# Patient Record
Sex: MALE | Employment: FULL TIME | URBAN - METROPOLITAN AREA
[De-identification: names, ages, dates, MRNs, and addresses within clinical notes are randomized per-mention and may not be internally consistent; named-entity substitution may affect disease eponyms.]

---

## 2017-02-16 ENCOUNTER — OFFICE VISIT (OUTPATIENT)
Dept: FAMILY MEDICINE CLINIC | Age: 52
End: 2017-02-16

## 2017-02-16 VITALS
SYSTOLIC BLOOD PRESSURE: 165 MMHG | RESPIRATION RATE: 14 BRPM | HEIGHT: 75 IN | WEIGHT: 188 LBS | OXYGEN SATURATION: 98 % | DIASTOLIC BLOOD PRESSURE: 100 MMHG | BODY MASS INDEX: 23.38 KG/M2 | HEART RATE: 90 BPM

## 2017-02-16 DIAGNOSIS — I10 ESSENTIAL HYPERTENSION: Primary | ICD-10-CM

## 2017-02-16 DIAGNOSIS — F10.10 ALCOHOL ABUSE: ICD-10-CM

## 2017-02-16 DIAGNOSIS — G60.9 IDIOPATHIC PERIPHERAL NEUROPATHY: ICD-10-CM

## 2017-02-16 DIAGNOSIS — M10.472 ACUTE GOUT DUE TO OTHER SECONDARY CAUSE INVOLVING TOE OF LEFT FOOT: ICD-10-CM

## 2017-02-16 PROCEDURE — 99214 OFFICE O/P EST MOD 30 MIN: CPT | Performed by: FAMILY MEDICINE

## 2017-02-16 RX ORDER — FOLIC ACID 1 MG/1
1 TABLET ORAL DAILY
Qty: 30 TABLET | Refills: 3 | Status: SHIPPED | OUTPATIENT
Start: 2017-02-16 | End: 2017-06-01 | Stop reason: SDUPTHER

## 2017-02-16 RX ORDER — INDOMETHACIN 50 MG/1
50 CAPSULE ORAL
Qty: 30 CAPSULE | Refills: 3 | Status: SHIPPED | OUTPATIENT
Start: 2017-02-16 | End: 2017-06-01 | Stop reason: SDUPTHER

## 2017-02-16 RX ORDER — METOPROLOL SUCCINATE 50 MG/1
50 TABLET, EXTENDED RELEASE ORAL DAILY
Qty: 30 TABLET | Refills: 3 | Status: SHIPPED | OUTPATIENT
Start: 2017-02-16 | End: 2017-06-01 | Stop reason: SDUPTHER

## 2017-03-16 RX ORDER — LOSARTAN POTASSIUM 50 MG/1
50 TABLET ORAL DAILY
Qty: 30 TABLET | Refills: 0 | Status: SHIPPED | OUTPATIENT
Start: 2017-03-16 | End: 2017-04-28 | Stop reason: SDUPTHER

## 2017-04-29 RX ORDER — LOSARTAN POTASSIUM 50 MG/1
50 TABLET ORAL DAILY
Qty: 30 TABLET | Refills: 5 | Status: SHIPPED | OUTPATIENT
Start: 2017-04-29 | End: 2017-11-30 | Stop reason: SDUPTHER

## 2017-05-14 DIAGNOSIS — G60.9 IDIOPATHIC PERIPHERAL NEUROPATHY: ICD-10-CM

## 2017-05-15 RX ORDER — GABAPENTIN 600 MG/1
TABLET ORAL
Qty: 90 TABLET | Refills: 1 | Status: SHIPPED | OUTPATIENT
Start: 2017-05-15 | End: 2018-02-08 | Stop reason: SDUPTHER

## 2017-06-01 DIAGNOSIS — I10 ESSENTIAL HYPERTENSION: ICD-10-CM

## 2017-06-01 DIAGNOSIS — M10.472 ACUTE GOUT DUE TO OTHER SECONDARY CAUSE INVOLVING TOE OF LEFT FOOT: ICD-10-CM

## 2017-06-01 DIAGNOSIS — F10.10 ALCOHOL ABUSE: ICD-10-CM

## 2017-06-01 RX ORDER — METOPROLOL SUCCINATE 50 MG/1
50 TABLET, EXTENDED RELEASE ORAL DAILY
Qty: 30 TABLET | Refills: 3 | Status: SHIPPED | OUTPATIENT
Start: 2017-06-01 | End: 2017-09-30 | Stop reason: SDUPTHER

## 2017-06-01 RX ORDER — FOLIC ACID 1 MG/1
1 TABLET ORAL DAILY
Qty: 30 TABLET | Refills: 3 | Status: SHIPPED | OUTPATIENT
Start: 2017-06-01 | End: 2017-09-30 | Stop reason: SDUPTHER

## 2017-06-01 RX ORDER — INDOMETHACIN 50 MG/1
50 CAPSULE ORAL
Qty: 30 CAPSULE | Refills: 3 | Status: SHIPPED | OUTPATIENT
Start: 2017-06-01 | End: 2017-08-09 | Stop reason: ALTCHOICE

## 2017-08-09 ENCOUNTER — PROCEDURE VISIT (OUTPATIENT)
Dept: FAMILY MEDICINE CLINIC | Age: 52
End: 2017-08-09

## 2017-08-09 VITALS
OXYGEN SATURATION: 93 % | RESPIRATION RATE: 14 BRPM | DIASTOLIC BLOOD PRESSURE: 84 MMHG | WEIGHT: 180.6 LBS | BODY MASS INDEX: 22.46 KG/M2 | SYSTOLIC BLOOD PRESSURE: 112 MMHG | HEART RATE: 105 BPM | HEIGHT: 75 IN

## 2017-08-09 DIAGNOSIS — F07.81 POST-CONCUSSION SYNDROME: ICD-10-CM

## 2017-08-09 DIAGNOSIS — Z72.0 TOBACCO ABUSE: ICD-10-CM

## 2017-08-09 DIAGNOSIS — S01.01XD SCALP LACERATION, SUBSEQUENT ENCOUNTER: ICD-10-CM

## 2017-08-09 DIAGNOSIS — M54.9 UPPER BACK PAIN ON LEFT SIDE: ICD-10-CM

## 2017-08-09 DIAGNOSIS — S09.90XD HEAD INJURY, SUBSEQUENT ENCOUNTER: Primary | ICD-10-CM

## 2017-08-09 DIAGNOSIS — F10.10 ALCOHOL ABUSE: ICD-10-CM

## 2017-08-09 PROCEDURE — 99215 OFFICE O/P EST HI 40 MIN: CPT | Performed by: NURSE PRACTITIONER

## 2017-08-09 RX ORDER — IBUPROFEN 600 MG/1
600 TABLET ORAL EVERY 8 HOURS PRN
Qty: 90 TABLET | Refills: 0 | Status: SHIPPED | OUTPATIENT
Start: 2017-08-09 | End: 2017-09-30 | Stop reason: SDUPTHER

## 2017-08-09 ASSESSMENT — ENCOUNTER SYMPTOMS
BACK PAIN: 1
WHEEZING: 0
VOMITING: 1
NAUSEA: 0
SHORTNESS OF BREATH: 0
EYE PAIN: 0
COUGH: 1
BLURRED VISION: 1

## 2017-08-09 ASSESSMENT — PATIENT HEALTH QUESTIONNAIRE - PHQ9
2. FEELING DOWN, DEPRESSED OR HOPELESS: 0
SUM OF ALL RESPONSES TO PHQ QUESTIONS 1-9: 0
SUM OF ALL RESPONSES TO PHQ9 QUESTIONS 1 & 2: 0
1. LITTLE INTEREST OR PLEASURE IN DOING THINGS: 0

## 2017-09-30 DIAGNOSIS — F10.10 ALCOHOL ABUSE: ICD-10-CM

## 2017-09-30 DIAGNOSIS — M54.9 UPPER BACK PAIN ON LEFT SIDE: ICD-10-CM

## 2017-09-30 DIAGNOSIS — I10 ESSENTIAL HYPERTENSION: ICD-10-CM

## 2017-10-02 RX ORDER — IBUPROFEN 600 MG/1
TABLET ORAL
Qty: 90 TABLET | Refills: 0 | Status: SHIPPED | OUTPATIENT
Start: 2017-10-02 | End: 2017-11-30 | Stop reason: SDUPTHER

## 2017-10-03 RX ORDER — INDOMETHACIN 50 MG/1
CAPSULE ORAL
Qty: 30 CAPSULE | Refills: 3 | Status: SHIPPED | OUTPATIENT
Start: 2017-10-03 | End: 2019-01-24

## 2017-10-03 RX ORDER — METOPROLOL SUCCINATE 50 MG/1
TABLET, EXTENDED RELEASE ORAL
Qty: 30 TABLET | Refills: 3 | Status: SHIPPED | OUTPATIENT
Start: 2017-10-03 | End: 2019-01-24

## 2017-10-03 RX ORDER — MAGNESIUM OXIDE 400 MG/1
TABLET ORAL
Qty: 30 TABLET | Refills: 2 | Status: SHIPPED | OUTPATIENT
Start: 2017-10-03 | End: 2017-11-30 | Stop reason: SDUPTHER

## 2017-10-03 RX ORDER — FOLIC ACID 1 MG/1
TABLET ORAL
Qty: 30 TABLET | Refills: 3 | Status: SHIPPED | OUTPATIENT
Start: 2017-10-03 | End: 2019-01-24

## 2017-10-06 ENCOUNTER — OFFICE VISIT (OUTPATIENT)
Dept: FAMILY MEDICINE CLINIC | Age: 52
End: 2017-10-06

## 2017-10-06 VITALS
OXYGEN SATURATION: 98 % | SYSTOLIC BLOOD PRESSURE: 144 MMHG | HEART RATE: 74 BPM | WEIGHT: 198.2 LBS | DIASTOLIC BLOOD PRESSURE: 92 MMHG | BODY MASS INDEX: 24.77 KG/M2

## 2017-10-06 DIAGNOSIS — Z09 HOSPITAL DISCHARGE FOLLOW-UP: Primary | ICD-10-CM

## 2017-10-06 DIAGNOSIS — F10.10 ALCOHOL ABUSE: ICD-10-CM

## 2017-10-06 DIAGNOSIS — I10 ESSENTIAL HYPERTENSION: ICD-10-CM

## 2017-10-06 DIAGNOSIS — S02.2XXA CLOSED FRACTURE OF NASAL BONE, INITIAL ENCOUNTER: ICD-10-CM

## 2017-10-06 PROCEDURE — 99214 OFFICE O/P EST MOD 30 MIN: CPT | Performed by: FAMILY MEDICINE

## 2017-10-06 NOTE — MR AVS SNAPSHOT
After Visit Summary             Codi Mix   10/6/2017 1:45 PM   Office Visit    Description:  Male : 1965   Provider:  Deb Dillard MD   Department:  [address] [place] [address] Medicine              Your Follow-Up and Future Appointments         Below is a list of your follow-up and future appointments. This may not be a complete list as you may have made appointments directly with providers that we are not aware of or your providers may have made some for you. Please call your providers to confirm appointments. It is important to keep your appointments. Please bring your current insurance card, photo ID, co-pay, and all medication bottles to your appointment. If self-pay, payment is expected at the time of service.            Information from Your Visit        Department     Name Address Phone Fax    [address] [place] [address] [address] [phone]      You Were Seen for:         Joseph 38 discharge follow-up   [[id number]]         Vital Signs     Blood Pressure Pulse Weight Oxygen Saturation Body Mass Index Smoking Status    144/92 (Site: Left Arm, Position: Sitting, Cuff Size: Large Adult) 74 198 lb 3.2 oz (89.9 kg) 98% 24.77 kg/m2 Current Every Day Smoker         Medications and Orders      Your Current Medications Are              metoprolol succinate (TOPROL XL) 50 MG extended release tablet TAKE 1 TABLET BY MOUTH DAILY    folic acid (FOLVITE) 1 MG tablet TAKE 1 TABLET BY MOUTH DAILY    indomethacin (INDOCIN) 50 MG capsule TAKE 1 CAPSULE BY MOUTH THREE TIMES DAILY WITH MEALS    magnesium oxide (MAG-OX) 400 MG tablet TAKE 1 TABLET BY MOUTH DAILY    ibuprofen (ADVIL;MOTRIN) 600 MG tablet TAKE 1 TABLET BY MOUTH EVERY 8 HOURS AS NEEDED FOR PAIN    LORazepam (ATIVAN) 1 MG tablet TID for 1 day, then BID for 1 day, then QD for 1 day    gabapentin (NEURONTIN) 600 MG tablet TAKE 1 TABLET BY MOUTH 3 TIMES DAILY losartan (COZAAR) 50 MG tablet Take 1 tablet by mouth daily    vitamin B-1 (THIAMINE) 100 MG tablet       Allergies           No Known Allergies      We Ordered/Performed the following           Amb External Referral To Oral Maxillofacial Surgery     Comments: The patient can be scheduled with any member of the group, including the provider with the first available appointments. Additional Information        Basic Information     Date Of Birth Sex Race Ethnicity Preferred Language    1965 Male Unavailable Non-/Non  English      Problem List as of 10/6/2017  Date Reviewed: 8/9/2017                Dupuytren's disease    Tobacco abuse    Hypertension    Hepatic disease    Hyperlipidemia    Neuropathy, peripheral ([facility] [address]    Alcohol abuse      Your Goals as of 10/6/2017 at 2:50 PM              2/16/17 12/13/16       Lifestyle    Join a gym and quit smoking. 01/19/16   Not on track  Not on track      Immunizations as of 10/6/2017     Name Date    Tdap (Boostrix, Adacel) 8/26/2016, 7/9/2014      Preventive Care        Date Due    Hepatitis C screening is recommended for all adults regardless of risk factors born between [facility] at least once (lifetime) who have never been tested. 1965    HIV screening is recommended for all people regardless of risk factors  aged 15-65 years at least once (lifetime) who have never been HIV tested. 10/25/1980    Pneumococcal Vaccine - Pneumovax for adults aged 19-64 years with: chronic heart disease, chronic lung disease, diabetes mellitus, alcoholism, chronic liver disease, or cigarette smoking. (1 of 1 - PPSV23) 10/25/1984    Colonoscopy 10/25/2015    Yearly Flu Vaccine (1) 12/13/2017 (Originally 9/1/2017)    Cholesterol Screening 12/13/2021    Tetanus Combination Vaccine (3 - Td) 8/26/2026            MyChart Signup           Our records indicate that you have declined MyChart signup.

## 2017-10-06 NOTE — PROGRESS NOTES
Chief Complaint: Follow-Up from Hospital (Was seen in hospital on Monday, due to fall. Was seen in [facility]. Patient fell and broke nose. Patient stated the first time that he fell he has been feeling dizzy, back pain and feels like this fall could have been related to dizziness. )       HPI:  Reynaldo Muller is a 46 y.o. male here for hospital follow up. He was admitted for alcohol withdrawal seizures and fall. As per patient he does not remember the event but happened to fall over the face and regained his consciousness when he was in the ambulance. He is a heavy drinker and before the episode he did not drink for 2 days or so. He is not ready to quit alcohol or smoking at this point. He had the CT head and ct neck  CT neck did show T 1 closed fracture and he has been referred to neurosurgeons and he has to make an appointment . He still complaints of pain in face mostly in the nose and over his left cheek bone. He has multiple bruises and abrasions  He had a fall in July at his work place. He was concerned if this recent fall was secondary to the concussion that happened during his first fall that was in July. Since the discharge except for the soreness he is doing better. He is compliant with his hypertension medications    ROS:  Constitutional: Negative f. HENT: as mentioned above   Eyes: Negative for photophobia, discharge, redness and visual disturbance. Respiratory: Negative for cough, chest tightness, shortness of breath and wheezing. Cardiovascular: Negative for chest pain, palpitations and leg swelling. Gastrointestinal: Negative for abdominal pain, blood in stool, constipation, diarrhea, nausea and vomiting. Genitourinary: Negative for difficulty urinating, flank pain, frequency, hematuria and urgency. Musculoskeletal: complaints of pain in his face more than back   Skin: Negative for color change, pallor, rash and wound.    Neurological: Negative for dizziness, tremors, seizures, Physical exam:  Constitutional: he is oriented to person, place, and time. he appears well-developed and well-nourished. No distress. HENT:   Head: Normocephalic. Right Ear: External ear normal. Normal TM   Left Ear: External ear normal. Normal TM  Nose: Tenderness over the nasal bridge and its swollen and also tenderness over his left orbit  Mouth/Throat: Oropharynx is clear and moist. No oropharyngeal exudate. Eyes: Conjunctivae and EOM are normal. Pupils are equal, round, and reactive to light. Right eye exhibits no discharge. Left eye exhibits no discharge. No scleral icterus. Neck: Normal range of motion. No JVD present. No tracheal deviation present. No thyromegaly present. Cardiovascular: Normal rate, regular rhythm, normal heart sounds and intact distal pulses. No murmur heard. Pulmonary/Chest: Effort normal and breath sounds normal. No stridor. No respiratory distress. he has no wheezes. he has no rales. heexhibits no tenderness. Abdominal: Soft. Bowel sounds are normal. he exhibits no distension and no mass. There is no tenderness. There is no rebound and no guarding. Musculoskeletal: Tenderness over his back muscles but no tenderness over the spine  Lymphadenopathy:     he has no cervical adenopathy. Neurological:he is alert and oriented to person, place, and time. he has gross neurological exam normal with normal strength and normal gait  Skin: Multiple brusises      Assessment/Plan:   1. Hospital discharge follow-up  Admitted for alcohol withdrawal seizures and currently not ready to quit  Explained about abnromal lfts and he quotes he would try to quit but not ready yet  2. Alcohol abuse      3. Orbital fracture, sequela ([address]  Could be chronic but complaints of pain and difficulty in breathing and hence referral to maxillofacial surgery    4.  Closed fracture of nasal bone, initial encounter  - Amb External Referral To Oral Maxillofacial Surgery     Hypertension  Elevated today and could be due to pain and hence no changes made    No Follow-up on file.       Lillie Hubbard  10/6/2017 9:21 PM

## 2017-11-30 DIAGNOSIS — M54.9 UPPER BACK PAIN ON LEFT SIDE: ICD-10-CM

## 2017-11-30 DIAGNOSIS — F10.10 ALCOHOL ABUSE: ICD-10-CM

## 2017-11-30 RX ORDER — MAGNESIUM OXIDE 400 MG/1
TABLET ORAL
Qty: 30 TABLET | Refills: 10 | Status: SHIPPED | OUTPATIENT
Start: 2017-11-30 | End: 2018-10-30 | Stop reason: SDUPTHER

## 2017-11-30 RX ORDER — IBUPROFEN 600 MG/1
TABLET ORAL
Qty: 90 TABLET | Refills: 2 | Status: SHIPPED | OUTPATIENT
Start: 2017-11-30 | End: 2019-01-24

## 2017-11-30 RX ORDER — LOSARTAN POTASSIUM 50 MG/1
50 TABLET ORAL DAILY
Qty: 30 TABLET | Refills: 10 | Status: SHIPPED | OUTPATIENT
Start: 2017-11-30 | End: 2018-09-04 | Stop reason: SDUPTHER

## 2017-11-30 NOTE — TELEPHONE ENCOUNTER
Last OV  10/06/2017 hospital follow up   losartan  Last Refill 04/29/2017 #30 5 refills       Magnesium oxide  Last refill 10/03/2017 # 30 2 refills

## 2017-11-30 NOTE — TELEPHONE ENCOUNTER
Last OV  10/06/2017 hospital discharge follow up   Last Refill 10/02/2017 # 90 0 refills   Lab Results   Component Value Date     10/02/2017    K 3.6 10/02/2017     10/02/2017    CO2 29 10/02/2017    BUN 15 10/02/2017    CREATININE 0.8 (L) 10/02/2017    GLUCOSE 170 (H) 10/02/2017    CALCIUM 8.1 (L) 10/02/2017    PROT 6.1 (L) 10/02/2017    LABALBU 3.5 10/02/2017    BILITOT 0.5 10/02/2017    ALKPHOS 131 (H) 10/02/2017    AST 33 10/02/2017    ALT 13 10/02/2017    LABGLOM >60 10/02/2017    GFRAA >60 10/02/2017    AGRATIO 1.3 10/02/2017    GLOB 2.6 10/02/2017

## 2018-02-08 DIAGNOSIS — G60.9 IDIOPATHIC PERIPHERAL NEUROPATHY: ICD-10-CM

## 2018-02-08 RX ORDER — GABAPENTIN 600 MG/1
TABLET ORAL
Qty: 90 TABLET | Refills: 2 | Status: SHIPPED | OUTPATIENT
Start: 2018-02-08 | End: 2019-01-24 | Stop reason: SDUPTHER

## 2018-02-08 NOTE — TELEPHONE ENCOUNTER
Medication and Quantity requested:  gabapentin (NEURONTIN) 600 MG tablet - qty 90        Last Visit  10/06/17 - Dr Chance Alvarenga and phone number updated in EPIC:  Yes

## 2018-09-06 RX ORDER — LOSARTAN POTASSIUM 50 MG/1
50 TABLET ORAL DAILY
Qty: 30 TABLET | Refills: 2 | Status: SHIPPED | OUTPATIENT
Start: 2018-09-06 | End: 2018-12-26 | Stop reason: SDUPTHER

## 2018-10-30 DIAGNOSIS — F10.10 ALCOHOL ABUSE: ICD-10-CM

## 2018-10-30 RX ORDER — MAGNESIUM OXIDE 400 MG/1
TABLET ORAL
Qty: 30 TABLET | Refills: 0 | Status: SHIPPED | OUTPATIENT
Start: 2018-10-30 | End: 2018-11-27 | Stop reason: SDUPTHER

## 2018-11-27 DIAGNOSIS — F10.10 ALCOHOL ABUSE: ICD-10-CM

## 2018-11-27 RX ORDER — MAGNESIUM OXIDE 400 MG/1
TABLET ORAL
Qty: 30 TABLET | Refills: 2 | Status: SHIPPED | OUTPATIENT
Start: 2018-11-27 | End: 2019-01-24

## 2018-12-26 RX ORDER — LOSARTAN POTASSIUM 50 MG/1
50 TABLET ORAL DAILY
Qty: 30 TABLET | Refills: 0 | Status: SHIPPED | OUTPATIENT
Start: 2018-12-26 | End: 2019-01-22 | Stop reason: SDUPTHER

## 2018-12-26 NOTE — TELEPHONE ENCOUNTER
[phone] ([place])    Left message to scheduled appointment   LOV: 10/6/17  LRF: 9/6/18, 30, 0 refills  Teed up

## 2019-01-24 ENCOUNTER — OFFICE VISIT (OUTPATIENT)
Dept: FAMILY MEDICINE CLINIC | Age: 54
End: 2019-01-24
Payer: COMMERCIAL

## 2019-01-24 VITALS
HEIGHT: 75 IN | SYSTOLIC BLOOD PRESSURE: 124 MMHG | OXYGEN SATURATION: 98 % | BODY MASS INDEX: 25.84 KG/M2 | DIASTOLIC BLOOD PRESSURE: 86 MMHG | WEIGHT: 207.8 LBS | HEART RATE: 88 BPM

## 2019-01-24 DIAGNOSIS — I10 ESSENTIAL HYPERTENSION: ICD-10-CM

## 2019-01-24 DIAGNOSIS — G60.9 IDIOPATHIC PERIPHERAL NEUROPATHY: ICD-10-CM

## 2019-01-24 DIAGNOSIS — M72.0 DUPUYTREN CONTRACTURE: Primary | ICD-10-CM

## 2019-01-24 DIAGNOSIS — F10.10 ALCOHOL ABUSE: ICD-10-CM

## 2019-01-24 PROCEDURE — 3017F COLORECTAL CA SCREEN DOC REV: CPT | Performed by: NURSE PRACTITIONER

## 2019-01-24 PROCEDURE — G8427 DOCREV CUR MEDS BY ELIG CLIN: HCPCS | Performed by: NURSE PRACTITIONER

## 2019-01-24 PROCEDURE — 4004F PT TOBACCO SCREEN RCVD TLK: CPT | Performed by: NURSE PRACTITIONER

## 2019-01-24 PROCEDURE — G8484 FLU IMMUNIZE NO ADMIN: HCPCS | Performed by: NURSE PRACTITIONER

## 2019-01-24 PROCEDURE — 99214 OFFICE O/P EST MOD 30 MIN: CPT | Performed by: NURSE PRACTITIONER

## 2019-01-24 PROCEDURE — G8419 CALC BMI OUT NRM PARAM NOF/U: HCPCS | Performed by: NURSE PRACTITIONER

## 2019-01-24 RX ORDER — GABAPENTIN 600 MG/1
TABLET ORAL
Qty: 90 TABLET | Refills: 1 | Status: SHIPPED | OUTPATIENT
Start: 2019-01-24 | End: 2019-02-12

## 2019-01-24 ASSESSMENT — PATIENT HEALTH QUESTIONNAIRE - PHQ9
SUM OF ALL RESPONSES TO PHQ9 QUESTIONS 1 & 2: 0
2. FEELING DOWN, DEPRESSED OR HOPELESS: 0
1. LITTLE INTEREST OR PLEASURE IN DOING THINGS: 0
SUM OF ALL RESPONSES TO PHQ QUESTIONS 1-9: 0
SUM OF ALL RESPONSES TO PHQ QUESTIONS 1-9: 0

## 2019-01-24 ASSESSMENT — ENCOUNTER SYMPTOMS: SHORTNESS OF BREATH: 0

## 2019-02-05 ENCOUNTER — TELEPHONE (OUTPATIENT)
Dept: FAMILY MEDICINE CLINIC | Age: 54
End: 2019-02-05

## 2019-02-12 ENCOUNTER — OFFICE VISIT (OUTPATIENT)
Dept: FAMILY MEDICINE CLINIC | Age: 54
End: 2019-02-12
Payer: COMMERCIAL

## 2019-02-12 VITALS
DIASTOLIC BLOOD PRESSURE: 82 MMHG | HEART RATE: 70 BPM | SYSTOLIC BLOOD PRESSURE: 130 MMHG | OXYGEN SATURATION: 98 % | BODY MASS INDEX: 26.35 KG/M2 | WEIGHT: 210.8 LBS

## 2019-02-12 DIAGNOSIS — G60.9 IDIOPATHIC PERIPHERAL NEUROPATHY: Primary | ICD-10-CM

## 2019-02-12 PROCEDURE — 4004F PT TOBACCO SCREEN RCVD TLK: CPT | Performed by: FAMILY MEDICINE

## 2019-02-12 PROCEDURE — 99213 OFFICE O/P EST LOW 20 MIN: CPT | Performed by: FAMILY MEDICINE

## 2019-02-12 PROCEDURE — G8427 DOCREV CUR MEDS BY ELIG CLIN: HCPCS | Performed by: FAMILY MEDICINE

## 2019-02-12 PROCEDURE — G8419 CALC BMI OUT NRM PARAM NOF/U: HCPCS | Performed by: FAMILY MEDICINE

## 2019-02-12 PROCEDURE — 3017F COLORECTAL CA SCREEN DOC REV: CPT | Performed by: FAMILY MEDICINE

## 2019-02-12 PROCEDURE — G8484 FLU IMMUNIZE NO ADMIN: HCPCS | Performed by: FAMILY MEDICINE

## 2019-02-12 RX ORDER — GABAPENTIN 800 MG/1
TABLET ORAL
Qty: 90 TABLET | Refills: 5 | Status: SHIPPED | OUTPATIENT
Start: 2019-02-12 | End: 2019-08-23 | Stop reason: SDUPTHER

## 2019-02-12 ASSESSMENT — ENCOUNTER SYMPTOMS
GASTROINTESTINAL NEGATIVE: 1
RESPIRATORY NEGATIVE: 1

## 2019-02-13 ENCOUNTER — OFFICE VISIT (OUTPATIENT)
Dept: ORTHOPEDIC SURGERY | Age: 54
End: 2019-02-13
Payer: COMMERCIAL

## 2019-02-13 VITALS
HEART RATE: 65 BPM | HEIGHT: 75 IN | BODY MASS INDEX: 26.11 KG/M2 | DIASTOLIC BLOOD PRESSURE: 75 MMHG | SYSTOLIC BLOOD PRESSURE: 125 MMHG | WEIGHT: 210 LBS

## 2019-02-13 DIAGNOSIS — M79.641 PAIN IN BOTH HANDS: Primary | ICD-10-CM

## 2019-02-13 DIAGNOSIS — M72.0 DUPUYTREN'S CONTRACTURE: ICD-10-CM

## 2019-02-13 DIAGNOSIS — M79.642 PAIN IN BOTH HANDS: Primary | ICD-10-CM

## 2019-02-13 PROCEDURE — G8484 FLU IMMUNIZE NO ADMIN: HCPCS | Performed by: ORTHOPAEDIC SURGERY

## 2019-02-13 PROCEDURE — G8419 CALC BMI OUT NRM PARAM NOF/U: HCPCS | Performed by: ORTHOPAEDIC SURGERY

## 2019-02-13 PROCEDURE — G8427 DOCREV CUR MEDS BY ELIG CLIN: HCPCS | Performed by: ORTHOPAEDIC SURGERY

## 2019-02-13 PROCEDURE — 99243 OFF/OP CNSLTJ NEW/EST LOW 30: CPT | Performed by: ORTHOPAEDIC SURGERY

## 2019-02-13 PROCEDURE — 3017F COLORECTAL CA SCREEN DOC REV: CPT | Performed by: ORTHOPAEDIC SURGERY

## 2019-02-15 ENCOUNTER — PROCEDURE VISIT (OUTPATIENT)
Dept: NEUROLOGY | Age: 54
End: 2019-02-15
Payer: COMMERCIAL

## 2019-02-15 DIAGNOSIS — G56.23 ULNAR NEUROPATHY OF BOTH UPPER EXTREMITIES: Primary | ICD-10-CM

## 2019-02-15 PROCEDURE — 95886 MUSC TEST DONE W/N TEST COMP: CPT | Performed by: PSYCHIATRY & NEUROLOGY

## 2019-02-15 PROCEDURE — 95911 NRV CNDJ TEST 9-10 STUDIES: CPT | Performed by: PSYCHIATRY & NEUROLOGY

## 2019-02-26 ENCOUNTER — TELEPHONE (OUTPATIENT)
Dept: ORTHOPEDIC SURGERY | Age: 54
End: 2019-02-26

## 2019-04-22 RX ORDER — GABAPENTIN 600 MG/1
TABLET ORAL
Qty: 90 TABLET | Refills: 3 | Status: SHIPPED | OUTPATIENT
Start: 2019-04-22 | End: 2019-10-30 | Stop reason: SDUPTHER

## 2019-04-22 NOTE — TELEPHONE ENCOUNTER
LOV: 2/12/19  LRF: 2/12/19, 90, 5 refills  Teed up   Lab Results   Component Value Date     10/02/2017    K 3.6 10/02/2017     10/02/2017    CO2 29 10/02/2017    BUN 15 10/02/2017    CREATININE 0.8 (L) 10/02/2017    GLUCOSE 170 (H) 10/02/2017    CALCIUM 8.1 (L) 10/02/2017    PROT 6.1 (L) 10/02/2017    LABALBU 3.5 10/02/2017    BILITOT 0.5 10/02/2017    ALKPHOS 131 (H) 10/02/2017    AST 33 10/02/2017    ALT 13 10/02/2017    LABGLOM >60 10/02/2017    GFRAA >60 10/02/2017    AGRATIO 1.3 10/02/2017    GLOB 2.6 10/02/2017

## 2019-04-25 ENCOUNTER — TELEPHONE (OUTPATIENT)
Dept: ORTHOPEDIC SURGERY | Age: 54
End: 2019-04-25

## 2019-04-25 NOTE — TELEPHONE ENCOUNTER
Pt is calling to see if we got an approval for his injections. Please call leave a message if he doesn't answer.

## 2019-04-26 NOTE — TELEPHONE ENCOUNTER
Pt is calling back about his injections   Please call he would like a call back  He would like a call back today.

## 2019-04-26 NOTE — TELEPHONE ENCOUNTER
Lmom advising pt to call [facility]. They have tried reaching pt to have medication shipped. Can schedule after received.

## 2019-05-10 ENCOUNTER — TELEPHONE (OUTPATIENT)
Dept: ORTHOPEDIC SURGERY | Age: 54
End: 2019-05-10

## 2019-05-13 ENCOUNTER — TELEPHONE (OUTPATIENT)
Dept: ORTHOPEDIC SURGERY | Age: 54
End: 2019-05-13

## 2019-05-13 NOTE — TELEPHONE ENCOUNTER
Told pt that Jono Rustyromero will call him by Wednesday after we get the medicine in and she has to go over the schedule for the 12th of June for an appt. He said that was great. I told him I called him back Friday he said his cell phone is not working. I said I called the home as well he said might be something wrong with it.

## 2019-05-21 ENCOUNTER — TELEPHONE (OUTPATIENT)
Dept: ORTHOPEDIC SURGERY | Age: 54
End: 2019-05-21

## 2019-07-11 ENCOUNTER — TELEPHONE (OUTPATIENT)
Dept: FAMILY MEDICINE CLINIC | Age: 54
End: 2019-07-11

## 2019-07-11 RX ORDER — NAPROXEN 375 MG/1
375 TABLET ORAL 2 TIMES DAILY WITH MEALS
Qty: 60 TABLET | Refills: 0 | Status: SHIPPED | OUTPATIENT
Start: 2019-07-11 | End: 2020-06-26

## 2019-07-11 NOTE — TELEPHONE ENCOUNTER
Pt called w/ c/o wrist pain. Pt has to work tonight and would like to know if Dr Michelle Church call in Rx for his wrist?   Or should pt be seen? There are appt opens blocked @ 618. Please advise.  Thanks

## 2019-08-23 DIAGNOSIS — G60.9 IDIOPATHIC PERIPHERAL NEUROPATHY: ICD-10-CM

## 2019-08-23 RX ORDER — GABAPENTIN 800 MG/1
TABLET ORAL
Qty: 90 TABLET | Refills: 5 | Status: SHIPPED | OUTPATIENT
Start: 2019-08-23 | End: 2020-02-24

## 2019-10-31 RX ORDER — GABAPENTIN 600 MG/1
TABLET ORAL
Qty: 90 TABLET | Refills: 3 | Status: SHIPPED | OUTPATIENT
Start: 2019-10-31 | End: 2020-05-11

## 2019-11-09 ENCOUNTER — OFFICE VISIT (OUTPATIENT)
Dept: FAMILY MEDICINE CLINIC | Age: 54
End: 2019-11-09
Payer: COMMERCIAL

## 2019-11-09 VITALS
BODY MASS INDEX: 27.77 KG/M2 | HEART RATE: 79 BPM | SYSTOLIC BLOOD PRESSURE: 122 MMHG | DIASTOLIC BLOOD PRESSURE: 80 MMHG | OXYGEN SATURATION: 95 % | WEIGHT: 222.2 LBS

## 2019-11-09 DIAGNOSIS — H69.83 EUSTACHIAN TUBE DYSFUNCTION, BILATERAL: ICD-10-CM

## 2019-11-09 DIAGNOSIS — H92.02 OTALGIA, LEFT: Primary | ICD-10-CM

## 2019-11-09 PROCEDURE — 99213 OFFICE O/P EST LOW 20 MIN: CPT | Performed by: NURSE PRACTITIONER

## 2019-11-09 PROCEDURE — 4004F PT TOBACCO SCREEN RCVD TLK: CPT | Performed by: NURSE PRACTITIONER

## 2019-11-09 PROCEDURE — G8427 DOCREV CUR MEDS BY ELIG CLIN: HCPCS | Performed by: NURSE PRACTITIONER

## 2019-11-09 PROCEDURE — 3017F COLORECTAL CA SCREEN DOC REV: CPT | Performed by: NURSE PRACTITIONER

## 2019-11-09 PROCEDURE — G8484 FLU IMMUNIZE NO ADMIN: HCPCS | Performed by: NURSE PRACTITIONER

## 2019-11-09 PROCEDURE — G8419 CALC BMI OUT NRM PARAM NOF/U: HCPCS | Performed by: NURSE PRACTITIONER

## 2019-11-09 RX ORDER — PSEUDOEPHEDRINE HYDROCHLORIDE 30 MG/1
30 TABLET ORAL EVERY 6 HOURS PRN
Qty: 30 TABLET | Refills: 0 | Status: SHIPPED | OUTPATIENT
Start: 2019-11-09 | End: 2020-06-26

## 2019-11-09 RX ORDER — GABAPENTIN 800 MG/1
TABLET ORAL
Qty: 90 TABLET | Refills: 5 | Status: CANCELLED | OUTPATIENT
Start: 2019-11-09 | End: 2019-12-10

## 2019-11-09 ASSESSMENT — ENCOUNTER SYMPTOMS
RHINORRHEA: 0
COUGH: 0
SORE THROAT: 0

## 2020-04-14 RX ORDER — GABAPENTIN 800 MG/1
TABLET ORAL
Qty: 90 TABLET | Refills: 1 | Status: SHIPPED | OUTPATIENT
Start: 2020-04-14 | End: 2020-06-16

## 2020-04-14 NOTE — TELEPHONE ENCOUNTER
Medication:   Requested Prescriptions     Pending Prescriptions Disp Refills    gabapentin (NEURONTIN) 800 MG tablet 90 tablet 0     Sig: TAKE 1 TABLET BY MOUTH THREE TIMES DAILY        Last Filled:  3/4/20 #90, 0 RF     Patient Phone Number: [phone] (home)     Last appt: 11/9/2019 ear pain   Next appt: Visit date not found

## 2020-04-14 NOTE — TELEPHONE ENCOUNTER
Patient notified and will rather wait to call back to schedule. He states he will forget about it ans he doesn't have a cell phone to get reminders on.

## 2020-04-19 ENCOUNTER — HOSPITAL ENCOUNTER (EMERGENCY)
Age: 55
Discharge: HOME | End: 2020-04-20
Payer: COMMERCIAL

## 2020-04-19 ENCOUNTER — APPOINTMENT (OUTPATIENT)
Dept: GENERAL RADIOLOGY | Age: 55
End: 2020-04-19
Payer: COMMERCIAL

## 2020-04-19 VITALS
SYSTOLIC BLOOD PRESSURE: 134 MMHG | TEMPERATURE: 98.2 F | OXYGEN SATURATION: 94 % | RESPIRATION RATE: 16 BRPM | BODY MASS INDEX: 27.98 KG/M2 | WEIGHT: 225 LBS | HEIGHT: 75 IN | DIASTOLIC BLOOD PRESSURE: 82 MMHG | HEART RATE: 102 BPM

## 2020-04-19 PROCEDURE — 73130 X-RAY EXAM OF HAND: CPT

## 2020-04-19 PROCEDURE — 6370000000 HC RX 637 (ALT 250 FOR IP): Performed by: PHYSICIAN ASSISTANT

## 2020-04-19 PROCEDURE — 99283 EMERGENCY DEPT VISIT LOW MDM: CPT

## 2020-04-19 PROCEDURE — 73110 X-RAY EXAM OF WRIST: CPT

## 2020-04-19 RX ORDER — PANTOPRAZOLE SODIUM 20 MG/1
40 TABLET, DELAYED RELEASE ORAL DAILY
Qty: 30 TABLET | Refills: 0 | Status: SHIPPED | OUTPATIENT
Start: 2020-04-19 | End: 2020-06-26

## 2020-04-19 RX ORDER — INDOMETHACIN 25 MG/1
50 CAPSULE ORAL ONCE
Status: COMPLETED | OUTPATIENT
Start: 2020-04-19 | End: 2020-04-19

## 2020-04-19 RX ORDER — INDOMETHACIN 50 MG/1
50 CAPSULE ORAL 2 TIMES DAILY WITH MEALS
Qty: 20 CAPSULE | Refills: 0 | Status: SHIPPED | OUTPATIENT
Start: 2020-04-19 | End: 2020-04-29 | Stop reason: SDUPTHER

## 2020-04-19 RX ORDER — COLCHICINE 0.6 MG/1
TABLET ORAL
Qty: 30 TABLET | Refills: 0 | Status: SHIPPED | OUTPATIENT
Start: 2020-04-19 | End: 2020-04-19 | Stop reason: ALTCHOICE

## 2020-04-19 RX ORDER — PREDNISONE 20 MG/1
TABLET ORAL
Qty: 18 TABLET | Refills: 0 | Status: SHIPPED | OUTPATIENT
Start: 2020-04-19 | End: 2020-04-29

## 2020-04-19 RX ORDER — SUCRALFATE ORAL 1 G/10ML
1 SUSPENSION ORAL 4 TIMES DAILY
Qty: 1200 ML | Refills: 3 | Status: SHIPPED | OUTPATIENT
Start: 2020-04-19 | End: 2020-06-26

## 2020-04-19 RX ADMIN — INDOMETHACIN 50 MG: 25 CAPSULE ORAL at 23:59

## 2020-04-19 ASSESSMENT — ENCOUNTER SYMPTOMS
COLOR CHANGE: 0
COUGH: 0
CONSTIPATION: 0
RESPIRATORY NEGATIVE: 1
SHORTNESS OF BREATH: 0
BACK PAIN: 0
DIARRHEA: 0
VOMITING: 0
ABDOMINAL PAIN: 0
NAUSEA: 0

## 2020-04-19 ASSESSMENT — PAIN SCALES - GENERAL
PAINLEVEL_OUTOF10: 10
PAINLEVEL_OUTOF10: 10

## 2020-04-20 NOTE — ED PROVIDER NOTES
change, appetite change, chills and fever. Respiratory: Negative. Negative for cough and shortness of breath. Cardiovascular: Negative. Negative for chest pain. Gastrointestinal: Negative for abdominal pain, constipation, diarrhea, nausea and vomiting. Genitourinary: Negative for difficulty urinating and dysuria. Musculoskeletal: Positive for arthralgias, joint swelling and myalgias. Negative for back pain, gait problem, neck pain and neck stiffness. Skin: Negative for color change, pallor, rash and wound. Neurological: Negative for dizziness, weakness, light-headedness, numbness and headaches. Positives and Pertinent negatives as per HPI. Except as noted above in the ROS, all other systems were reviewed and negative. PAST MEDICAL HISTORY     Past Medical History:   Diagnosis Date    Alcohol abuse     Gout     Hepatic disease     Hyperlipidemia     Hypertension     Neuropathy, peripheral          SURGICAL HISTORY     Past Surgical History:   Procedure Laterality Date    MOUTH SURGERY           CURRENTMEDICATIONS       Previous Medications    GABAPENTIN (NEURONTIN) 600 MG TABLET    TAKE 1 TABLET BY MOUTH THREE TIMES A DAY    GABAPENTIN (NEURONTIN) 800 MG TABLET    TAKE 1 TABLET BY MOUTH THREE TIMES DAILY    NAPROXEN (NAPROSYN) 375 MG TABLET    Take 1 tablet by mouth 2 times daily (with meals) Prn    PSEUDOEPHEDRINE (DECONGESTANT) 30 MG TABLET    Take 1 tablet by mouth every 6 hours as needed for Congestion         ALLERGIES     Patient has no known allergies.     FAMILYHISTORY       Family History   Problem Relation Age of Onset    Cancer Father         lung    Diabetes Maternal Grandmother     Diabetes Maternal Grandfather     Diabetes Paternal Grandmother     Diabetes Paternal Grandfather           SOCIAL HISTORY       Social History     Tobacco Use    Smoking status: Current Every Day Smoker     Packs/day: 2.00     Types: Cigarettes    Smokeless tobacco: Never Used Substance Use Topics    Alcohol use: Not Currently     Comment: daily - 6 pack per day  and 1L jag per day    Drug use: No       SCREENINGS             PHYSICAL EXAM    (up to 7 for level 4, 8 or more for level 5)     ED Triage Vitals [04/19/20 2219]   BP Temp Temp Source Pulse Resp SpO2 Height Weight   (!) 154/100 98.2 °F (36.8 °C) Oral 104 17 97 % 6' 3\" (1.905 m) 225 lb (102.1 kg)       Physical Exam  Constitutional:       Appearance: He is well-developed. He is not diaphoretic. HENT:      Head: Normocephalic and atraumatic. Right Ear: External ear normal.      Left Ear: External ear normal.   Eyes:      General:         Right eye: No discharge. Left eye: No discharge. Neck:      Musculoskeletal: Normal range of motion and neck supple. Pulmonary:      Effort: Pulmonary effort is normal. No respiratory distress. Breath sounds: No stridor. Musculoskeletal: Normal range of motion. Comments: Upon examination patient has diffuse edema and erythema noted to the right wrist.  Does have what appears to be some contractures to the hand which patient states is chronic. Able to wiggle fingers at this time but patient states decreased range of motion strength of the fingers due to chronic contractures. Patient able to flex and extend the wrist, ulnar deviate and radial deviate the wrist with associated pain. Radial pulse and capillary refill brisk. Sensation intact to the radial ulnar aspects of distal fingertips. No deformity noted. Warmth noted. No abrasion or laceration. No rashes or lesions. No pain over the forearm, elbow or shoulder. Compartment soft. Skin:     General: Skin is warm and dry. Coloration: Skin is not pale. Findings: No erythema. Neurological:      Mental Status: He is alert and oriented to person, place, and time.    Psychiatric:         Behavior: Behavior normal.         DIAGNOSTIC RESULTS   LABS:    Labs Reviewed - No data to display    All other labs were within normal range or not returned as of this dictation. EKG: All EKG's are interpreted by the Emergency Department Physician in the absence of a cardiologist.  Please see their note for interpretation of EKG. RADIOLOGY:   Non-plain film images such as CT, Ultrasound and MRI are read by the radiologist. Plain radiographic images are visualized and preliminarily interpreted by the ED Provider with the below findings:        Interpretation per the Radiologist below, if available at the time of this note:    XR WRIST RIGHT (MIN 3 VIEWS)   Preliminary Result   1. No acute abnormality in the right hand or wrist.   2. Mild-moderate degenerative changes in the right wrist.         XR HAND RIGHT (MIN 3 VIEWS)   Preliminary Result   1. No acute abnormality in the right hand or wrist.   2. Mild-moderate degenerative changes in the right wrist.           No results found. PROCEDURES   Unless otherwise noted below, none     Procedures    CRITICAL CARE TIME   N/A    CONSULTS:  None      EMERGENCY DEPARTMENT COURSE and DIFFERENTIAL DIAGNOSIS/MDM:   Vitals:    Vitals:    04/19/20 2219   BP: (!) 154/100   Pulse: 104   Resp: 17   Temp: 98.2 °F (36.8 °C)   TempSrc: Oral   SpO2: 97%   Weight: 225 lb (102.1 kg)   Height: 6' 3\" (1.905 m)       Patient was given the following medications:  Medications   indomethacin (INDOCIN) capsule 50 mg (has no administration in time range)       Patient is a 30-year-old male who presents knee pain right wrist pain and swelling. Patient has some contractures which he states is chronic. Erythema, warmth and swelling noted to the right wrist.  He has good range of motion strength with associated pain. No evidence of septic joint. Has history of gout and states current symptoms feel similar to when he has gout in the past.  Has never had gout in the wrist.  Does have history of document alcohol usage and may potentially predispose him to gout at this time.   X-ray obtained

## 2020-04-29 RX ORDER — INDOMETHACIN 50 MG/1
50 CAPSULE ORAL 2 TIMES DAILY WITH MEALS
Qty: 20 CAPSULE | Refills: 0 | Status: SHIPPED | OUTPATIENT
Start: 2020-04-29 | End: 2020-05-11

## 2020-04-29 NOTE — TELEPHONE ENCOUNTER
Medication:   Requested Prescriptions     Pending Prescriptions Disp Refills    indomethacin (INDOCIN) 50 MG capsule 20 capsule 0     Sig: Take 1 capsule by mouth 2 times daily (with meals)        Last Filled:  4/19/20 #20, 0 RF     Patient Phone Number: [phone] (home)     Last appt: 11/9/2019  Ear pain  Next appt: Visit date not found

## 2020-05-11 RX ORDER — GABAPENTIN 600 MG/1
TABLET ORAL
Qty: 90 TABLET | Refills: 1 | Status: SHIPPED | OUTPATIENT
Start: 2020-05-11 | End: 2020-06-26

## 2020-05-11 RX ORDER — INDOMETHACIN 50 MG/1
50 CAPSULE ORAL 2 TIMES DAILY WITH MEALS
Qty: 20 CAPSULE | Refills: 0 | Status: SHIPPED | OUTPATIENT
Start: 2020-05-11 | End: 2020-12-09 | Stop reason: ALTCHOICE

## 2020-06-15 ENCOUNTER — TELEPHONE (OUTPATIENT)
Dept: ORTHOPEDIC SURGERY | Age: 55
End: 2020-06-15

## 2020-06-16 RX ORDER — GABAPENTIN 800 MG/1
TABLET ORAL
Qty: 90 TABLET | Refills: 1 | Status: SHIPPED | OUTPATIENT
Start: 2020-06-16 | End: 2020-06-26 | Stop reason: SDUPTHER

## 2020-06-16 NOTE — TELEPHONE ENCOUNTER
Medication:   Requested Prescriptions     Pending Prescriptions Disp Refills    gabapentin (NEURONTIN) 800 MG tablet [Pharmacy Med Name: GABAPENTIN 800 MG TABLET] 90 tablet 1     Sig: TAKE 1 TABLET BY MOUTH THREE TIMES A DAY        Last Filled:  5/11/2020 #90 1rf    Patient Phone Number: [phone] (home)     Last appt: 11/9/2019   Next appt: Visit date not found    Last OARRS: No flowsheet data found.

## 2020-06-23 ENCOUNTER — TELEPHONE (OUTPATIENT)
Dept: ADMINISTRATIVE | Age: 55
End: 2020-06-23

## 2020-06-23 NOTE — TELEPHONE ENCOUNTER
Patient called today with back & knee pain similar gout  Was offered an appointment for  Virtual visit , refused appointment. Patient would like Ash Reyna MD to schedule an in office visit.     pharmacy confirmed :[address], [address] [place] [address] [phone]    Patient was made aware of e-visits via [employer]

## 2020-06-26 ENCOUNTER — OFFICE VISIT (OUTPATIENT)
Dept: FAMILY MEDICINE CLINIC | Age: 55
End: 2020-06-26
Payer: COMMERCIAL

## 2020-06-26 VITALS
SYSTOLIC BLOOD PRESSURE: 130 MMHG | HEART RATE: 93 BPM | WEIGHT: 222 LBS | BODY MASS INDEX: 27.75 KG/M2 | OXYGEN SATURATION: 96 % | DIASTOLIC BLOOD PRESSURE: 86 MMHG | TEMPERATURE: 97.1 F

## 2020-06-26 PROCEDURE — 99214 OFFICE O/P EST MOD 30 MIN: CPT | Performed by: FAMILY MEDICINE

## 2020-06-26 PROCEDURE — G8419 CALC BMI OUT NRM PARAM NOF/U: HCPCS | Performed by: FAMILY MEDICINE

## 2020-06-26 PROCEDURE — G8427 DOCREV CUR MEDS BY ELIG CLIN: HCPCS | Performed by: FAMILY MEDICINE

## 2020-06-26 PROCEDURE — 3017F COLORECTAL CA SCREEN DOC REV: CPT | Performed by: FAMILY MEDICINE

## 2020-06-26 PROCEDURE — 4004F PT TOBACCO SCREEN RCVD TLK: CPT | Performed by: FAMILY MEDICINE

## 2020-06-26 RX ORDER — GABAPENTIN 800 MG/1
TABLET ORAL
Qty: 90 TABLET | Refills: 1 | Status: SHIPPED | OUTPATIENT
Start: 2020-06-26 | End: 2020-10-29 | Stop reason: SDUPTHER

## 2020-06-26 NOTE — PROGRESS NOTES
[address] Note    Date: 6/26/2020                                               Subjective/Objective:     Chief Complaint   Patient presents with    Knee Pain    Back Pain    Toe Pain       HPI   L knee pain starting 4 days ago. No injury. Has had bursitis in the past. Located over anterior knee. Does not lock up or give out. Is getting better with Advil. Low back pain also started hurting 4 days ago. Located in midline at waistline. 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 Doesn't go down legs. No numbness. Pt needs refill of gabapentin, which he takes for neuropathy of his feet. States the pain is well controlled for the most part. Patient Active Problem List    Diagnosis Date Noted    Dupuytren's disease 01/19/2016    Tobacco abuse 01/19/2016    Hypertension     Hepatic disease     Hyperlipidemia     Neuropathy, peripheral     Alcohol abuse        Past Medical History:   Diagnosis Date    Alcohol abuse     Gout     Hepatic disease     Hyperlipidemia     Hypertension     Neuropathy, peripheral        Current Outpatient Medications   Medication Sig Dispense Refill    gabapentin (NEURONTIN) 800 MG tablet TAKE 1 TABLET BY MOUTH THREE TIMES A DAY 90 tablet 1    indomethacin (INDOCIN) 50 MG capsule TAKE 1 CAPSULE BY MOUTH 2 TIMES DAILY (WITH MEALS) 20 capsule 0     No current facility-administered medications for this visit. No Known Allergies    Review of Systems   No vomiting, no weakness    Vitals:  /86   Pulse 93   Temp 97.1 °F (36.2 °C)   Wt 222 lb (100.7 kg)   SpO2 96%   BMI 27.75 kg/m²     Physical Exam   General:  Well-appearing, NAD, alert, non-toxic  HEENT:  Normocephalic, atraumatic. CHEST/LUNGS: No dyspnea  EXTREMETIES: Normal movement of all extremities. No edema. No joint swelling.   SKIN:  No rash, no cellulitis, no bruising, no petechiae/purpura/vesicles/pustules/abscess  PSYCH:  A+O x 3; normal

## 2020-07-17 RX ORDER — GABAPENTIN 600 MG/1
TABLET ORAL
Qty: 90 TABLET | Refills: 1 | Status: SHIPPED | OUTPATIENT
Start: 2020-07-17 | End: 2020-12-09 | Stop reason: ALTCHOICE

## 2020-07-17 NOTE — TELEPHONE ENCOUNTER
Medication:   Requested Prescriptions     Pending Prescriptions Disp Refills    gabapentin (NEURONTIN) 600 MG tablet [Pharmacy Med Name: GABAPENTIN 600 MG TABLET] 90 tablet 1     Sig: TAKE 1 TABLET BY MOUTH THREE TIMES A DAY        Last Filled:  6/26/20 #90, 1 RF    Patient Phone Number: [phone] (home)     Last appt: 6/26/2020 knee pain, back pain, toe pain  Next appt: Visit date not found

## 2020-10-29 RX ORDER — GABAPENTIN 800 MG/1
TABLET ORAL
Qty: 90 TABLET | Refills: 1 | Status: SHIPPED | OUTPATIENT
Start: 2020-10-29 | End: 2020-11-21 | Stop reason: SDUPTHER

## 2020-10-29 NOTE — TELEPHONE ENCOUNTER
Medication and Quantity requested: gabapentin (NEURONTIN) 800 MG tablet   #90     Last Visit  6/26/20    Last Filled  6/26/20    Pharmacy and phone number updated in EPIC:  Yes, [facility]

## 2020-11-21 RX ORDER — GABAPENTIN 800 MG/1
TABLET ORAL
Qty: 90 TABLET | Refills: 1 | Status: SHIPPED | OUTPATIENT
Start: 2020-11-21 | End: 2021-02-18

## 2020-12-09 ENCOUNTER — OFFICE VISIT (OUTPATIENT)
Dept: FAMILY MEDICINE CLINIC | Age: 55
End: 2020-12-09
Payer: COMMERCIAL

## 2020-12-09 VITALS
DIASTOLIC BLOOD PRESSURE: 90 MMHG | WEIGHT: 219.2 LBS | HEART RATE: 72 BPM | BODY MASS INDEX: 27.26 KG/M2 | OXYGEN SATURATION: 98 % | HEIGHT: 75 IN | TEMPERATURE: 96.8 F | SYSTOLIC BLOOD PRESSURE: 150 MMHG

## 2020-12-09 DIAGNOSIS — Z12.5 SCREENING FOR PROSTATE CANCER: ICD-10-CM

## 2020-12-09 DIAGNOSIS — M10.00 IDIOPATHIC GOUT, UNSPECIFIED CHRONICITY, UNSPECIFIED SITE: ICD-10-CM

## 2020-12-09 DIAGNOSIS — K76.9 HEPATIC DISEASE: ICD-10-CM

## 2020-12-09 DIAGNOSIS — I10 ESSENTIAL HYPERTENSION: ICD-10-CM

## 2020-12-09 DIAGNOSIS — Z00.00 ANNUAL PHYSICAL EXAM: ICD-10-CM

## 2020-12-09 DIAGNOSIS — F10.10 ALCOHOL ABUSE: ICD-10-CM

## 2020-12-09 DIAGNOSIS — E78.2 MIXED HYPERLIPIDEMIA: ICD-10-CM

## 2020-12-09 PROBLEM — M15.2: Status: ACTIVE | Noted: 2020-12-09

## 2020-12-09 PROBLEM — M19.90 ARTHRITIS: Status: ACTIVE | Noted: 2020-12-09

## 2020-12-09 PROCEDURE — G8484 FLU IMMUNIZE NO ADMIN: HCPCS | Performed by: NURSE PRACTITIONER

## 2020-12-09 PROCEDURE — 3017F COLORECTAL CA SCREEN DOC REV: CPT | Performed by: NURSE PRACTITIONER

## 2020-12-09 PROCEDURE — G8419 CALC BMI OUT NRM PARAM NOF/U: HCPCS | Performed by: NURSE PRACTITIONER

## 2020-12-09 PROCEDURE — G8427 DOCREV CUR MEDS BY ELIG CLIN: HCPCS | Performed by: NURSE PRACTITIONER

## 2020-12-09 PROCEDURE — 4004F PT TOBACCO SCREEN RCVD TLK: CPT | Performed by: NURSE PRACTITIONER

## 2020-12-09 PROCEDURE — 99214 OFFICE O/P EST MOD 30 MIN: CPT | Performed by: NURSE PRACTITIONER

## 2020-12-09 RX ORDER — INDOMETHACIN 25 MG/1
25 CAPSULE ORAL 3 TIMES DAILY
Qty: 15 CAPSULE | Refills: 0 | Status: SHIPPED | OUTPATIENT
Start: 2020-12-09 | End: 2022-07-18

## 2020-12-09 RX ORDER — IBUPROFEN 800 MG/1
800 TABLET ORAL EVERY 8 HOURS PRN
Qty: 90 TABLET | Refills: 0 | Status: SHIPPED | OUTPATIENT
Start: 2020-12-09 | End: 2021-01-05

## 2020-12-09 RX ORDER — METHYLPREDNISOLONE 4 MG/1
TABLET ORAL
Qty: 1 KIT | Refills: 0 | Status: SHIPPED | OUTPATIENT
Start: 2020-12-09 | End: 2020-12-15

## 2020-12-09 ASSESSMENT — ENCOUNTER SYMPTOMS
VOMITING: 0
WHEEZING: 0
COUGH: 0
NAUSEA: 0
BLURRED VISION: 0
CHEST TIGHTNESS: 0
SHORTNESS OF BREATH: 0
BACK PAIN: 0

## 2020-12-09 ASSESSMENT — PATIENT HEALTH QUESTIONNAIRE - PHQ9
SUM OF ALL RESPONSES TO PHQ QUESTIONS 1-9: 0
SUM OF ALL RESPONSES TO PHQ QUESTIONS 1-9: 0
2. FEELING DOWN, DEPRESSED OR HOPELESS: 0
SUM OF ALL RESPONSES TO PHQ9 QUESTIONS 1 & 2: 0
1. LITTLE INTEREST OR PLEASURE IN DOING THINGS: 0
SUM OF ALL RESPONSES TO PHQ QUESTIONS 1-9: 0

## 2020-12-09 NOTE — PATIENT INSTRUCTIONS
Dietary modifications:  Avoid organ meats high in purine  Avoiding foods and beverages containing high fructose corn syrup  Reducing or eliminating alcohold use  Limiting seafood high in purine  Fluid intake of 3 liters per day. Eliminate smoking  Exercise regularly    Put all of your daily cigarettes in a baggie - remove two daily for one week. Next week do the same and remove four. And continue removing weekly until quit. Call ortho surgeon about getting duputryns fixed. Patient Education        Gout: Care Instructions  Your Care Instructions     Gout is a form of arthritis caused by a buildup of uric acid crystals in a joint. It causes sudden attacks of pain, swelling, redness, and stiffness, usually in one joint, especially the big toe. Gout usually comes on without a cause. But it can be brought on by drinking alcohol (especially beer) or eating seafood and red meat. Taking certain medicines, such as diuretics or aspirin, also can bring on an attack of gout. Taking your medicines as prescribed and following up with your doctor regularly can help you avoid gout attacks in the future. Follow-up care is a key part of your treatment and safety. Be sure to make and go to all appointments, and call your doctor if you are having problems. It's also a good idea to know your test results and keep a list of the medicines you take. How can you care for yourself at home? · If the joint is swollen, put ice or a cold pack on the area for 10 to 20 minutes at a time. Put a thin cloth between the ice and your skin. · Prop up the sore limb on a pillow when you ice it or anytime you sit or lie down during the next 3 days. Try to keep it above the level of your heart. This will help reduce swelling. · Rest sore joints. Avoid activities that put weight or strain on the joints for a few days. Take short rest breaks from your regular activities during the day. · Take your medicines exactly as prescribed.  Call your doctor if you think you are having a problem with your medicine. · Take pain medicines exactly as directed. ? If the doctor gave you a prescription medicine for pain, take it as prescribed. ? If you are not taking a prescription pain medicine, ask your doctor if you can take an over-the-counter medicine. · Eat less seafood and red meat. · Check with your doctor before drinking alcohol. · Losing weight, if you are overweight, may help reduce attacks of gout. But do not go on a [employer]. \" Losing a lot of weight in a short amount of time can cause a gout attack. When should you call for help? Call your doctor now or seek immediate medical care if:    · You have a fever.     · The joint is so painful you cannot use it.     · You have sudden, unexplained swelling, redness, warmth, or severe pain in one or more joints. Watch closely for changes in your health, and be sure to contact your doctor if:    · You have joint pain.     · Your symptoms get worse or are not improving after 2 or 3 days. Where can you learn more? Go to https://ArchiveSocial.[employer]. org and sign in to your [employer] account. Enter W011 in the [employer] box to learn more about \"Gout: Care Instructions. \"     If you do not have an account, please click on the \"Sign Up Now\" link. Current as of: December 9, 2019               Content Version: 12.6  © 3188-1609 [employer]. Care instructions adapted under license by [facility] ([facility]). If you have questions about a medical condition or this instruction, always ask your healthcare professional. [place] [address] any warranty or liability for your use of this information. Patient Education        Purine-Restricted Diet: Care Instructions  Your Care Instructions     Purines are substances that are found in some foods. Your body turns purines into uric acid.  High levels of uric acid can cause gout, which is a form of arthritis that causes pain and inflammation in joints. You may be able to help control the amount of uric acid in your body by limiting high-purine foods in your diet. Follow-up care is a key part of your treatment and safety. Be sure to make and go to all appointments, and call your doctor if you are having problems. It's also a good idea to know your test results and keep a list of the medicines you take. How can you care for yourself at home? · Plan your meals and snacks around foods that are low in purines and are safe for you to eat. These foods include:  ? Green vegetables and tomatoes. ? Fruits. ? Whole-grain breads, rice, and cereals. ? Eggs, peanut butter, and nuts. ? Low-fat milk, cheese, and other milk products. ? Popcorn. ? Gelatin desserts, chocolate, cocoa, and cakes and sweets, in small amounts. · You can eat certain foods that are medium-high in purines, but eat them only once in a while. These foods include:  ? Legumes, such as dried beans and dried peas. You can have 1 cup cooked legumes each day. ? Asparagus, cauliflower, spinach, mushrooms, and green peas. ? Fish and seafood (other than very high-purine seafood). ? Oatmeal, wheat bran, and wheat germ. · Limit very high-purine foods, including:  ? Organ meats, such as liver, kidneys, sweetbreads, and brains. ? Meats, including elena, beef, pork, and lamb. ? Game meats and any other meats in large amounts. ? Anchovies, sardines, herring, mackerel, and scallops. ? Gravy. ? Beer. Where can you learn more? Go to https://MicroEdgepebidu.com.breweb.[employer]. org and sign in to your [employer] account. Enter F448 in the [facility] box to learn more about \"Purine-Restricted Diet: Care Instructions. \"     If you do not have an account, please click on the \"Sign Up Now\" link. Current as of: August 22, 2019               Content Version: 12.6  © 3751-6646 [employer]. Care instructions adapted under license by [facility] ([facility]).  If you have questions about a medical condition or this instruction, always ask your healthcare professional. [address] any warranty or liability for your use of this information. Patient Education        Dupuytren's Contracture: Care Instructions  Your Care Instructions     In Dupuytren's contracture, the fingers become stiff and curl toward the palm. It is caused by thick tissue that grows under the skin in the palm of the hand. Sometimes the condition affects the palm but not the fingers. If the tissue gets thicker and affects one or more fingers, it may limit movement of your fingers and hand. Sometimes the condition can occur in the soles of the feet. The cause of Dupuytren's disease is not known. Dupuytren's disease may get worse slowly. If you have mild Dupuytren's disease, you may be able to keep your fingers moving with regular stretching. Surgery usually helps in severe cases. However, Dupuytren's disease can come back. Follow-up care is a key part of your treatment and safety. Be sure to make and go to all appointments, and call your doctor if you are having problems. It's also a good idea to know your test results and keep a list of the medicines you take. How can you care for yourself at home? · Follow your doctor's advice for physical or occupational therapy and exercises to put your fingers and hand through a range of motion. · Two times a day, massage your hand and gently stretch the fingers back. This can get rid of tightness and help keep your fingers flexible. · Try to avoid curling your hand tightly. For example, use utensils and tools that have larger hand . When should you call for help? Call your doctor now or seek immediate medical care if:    · You have numbness in your fingers.     · You have a wound or sore on your finger or palm.     · Your hand or fingers get worse.    Watch closely for changes in your health, and be sure to contact your doctor if you have any problems. Where can you learn more? Go to https://chpepiceweb.healthSilicon [employer]inetics. org and sign in to your [employer] account. Enter T888 in the [facility] box to learn more about \"Dupuytren's Contracture: Care Instructions. \"     If you do not have an account, please click on the \"Sign Up Now\" link. Current as of: March 2, 2020               Content Version: 12.6  © 2006-2020 [employer]. Care instructions adapted under license by [facility] [place] ([facility]). If you have questions about a medical condition or this instruction, always ask your healthcare professional. [address] any warranty or liability for your use of this information.

## 2020-12-09 NOTE — PROGRESS NOTES
Hypertension   This is a recurrent problem. The current episode started more than 1 year ago. The problem has been gradually worsening since onset. The problem is uncontrolled. Pertinent negatives include no blurred vision, chest pain, headaches, palpitations or shortness of breath. There are no associated agents to hypertension. Risk factors for coronary artery disease include male gender and smoking/tobacco exposure. Past treatments include nothing. Compliance problems include diet. There is no history of angina or PVD. There is no history of chronic renal disease or a thyroid problem. Alcohol Problem   Pertinent negatives include no weakness. This is a chronic problem. The current episode started more than 1 year ago. The problem is unchanged. Suspected agents include alcohol. Pertinent negatives include no bladder incontinence, fever, nausea or vomiting. Past treatments include nothing. His past medical history is significant for a withdrawal syndrome. There is no history of addiction treatment, a mental illness or a recent infection. The patient states he is alcohol free at this time. He states he quit three weeks ago without weaning himself off of alcohol. He states he is not stopping quitting at this time, however, he just wanted to slow down for a while. He denies any withdrawal symptoms. Review of Systems   Constitutional: Negative for fatigue and fever. Eyes: Negative for blurred vision. Respiratory: Negative for cough, chest tightness, shortness of breath and wheezing. Cardiovascular: Negative for chest pain, palpitations and leg swelling. Gastrointestinal: Negative for nausea and vomiting. Genitourinary: Negative for bladder incontinence. Musculoskeletal: Positive for arthralgias and joint swelling. Negative for back pain and myalgias. Left great toe pain, pain in left 5th digit, pain in finger joints bilateral hands. Skin: Negative.     Neurological: Negative for dizziness, weakness, light-headedness and headaches. Psychiatric/Behavioral: Negative. Prior to Visit Medications    Medication Sig Taking? Authorizing Provider   indomethacin (INDOCIN) 25 MG capsule Take 1 capsule by mouth 3 times daily for 5 days Yes Meli [facility] - ELIU   methylPREDNISolone (MEDROL DOSEPACK) 4 MG tablet Take by mouth. Yes Meli Yong, APRN - CNP   ibuprofen (ADVIL;MOTRIN) 800 MG tablet Take 1 tablet by mouth every 8 hours as needed for Pain Yes Meli Yong, APRN - CNP   gabapentin (NEURONTIN) 800 MG tablet TAKE 1 TABLET BY MOUTH THREE TIMES A DAY Yes Joe Snyder MD      Social History     Tobacco Use    Smoking status: Current Every Day Smoker     Packs/day: 2.00     Years: 10.00     Pack years: 20.00     Types: Cigarettes    Smokeless tobacco: Never Used   Substance Use Topics    Alcohol use: Not Currently     Comment: daily - 6 pack per day  and 1L jag per day    Drug use: No        Vitals:    12/09/20 1026 12/09/20 1055   BP: (!) 142/84 (!) 150/90   Site:  Left Upper Arm   Position:  Sitting   Cuff Size:  Medium Adult   Pulse: 72    Temp: 96.8 °F (36 °C)    SpO2: 98%    Weight: 219 lb 3.2 oz (99.4 kg)    Height: 6' 3\" (1.905 m)      Estimated body mass index is 27.4 kg/m² as calculated from the following:    Height as of this encounter: 6' 3\" (1.905 m). Weight as of this encounter: 219 lb 3.2 oz (99.4 kg). Physical Exam  Vitals signs and nursing note reviewed. Constitutional:       General: He is awake. He is not in acute distress. Appearance: Normal appearance. He is well-developed, well-groomed and normal weight. He is not ill-appearing, toxic-appearing or diaphoretic. Comments: Argumentative, disheveled    Eyes:      General: Lids are normal.      Comments: Eyes glossy, conjunctiva red   Cardiovascular:      Rate and Rhythm: Normal rate and regular rhythm. Heart sounds: Normal heart sounds, S1 normal and S2 normal. No murmur.    Pulmonary: Effort: Pulmonary effort is normal.      Breath sounds: Normal breath sounds and air entry. Musculoskeletal:         General: Swelling, tenderness and deformity present. No signs of injury. Right lower leg: No edema. Left lower leg: No edema. Feet:       Comments: Left fifth finger is contracted, thick banded tendon noted in palm. Very limited range of motion. Bilateral hands noted to have isabel nodes at the PIP joints on each finger, thumbs excluded. Feet:      Right foot:      Skin integrity: Skin integrity normal.      Toenail Condition: Right toenails are abnormally thick and long. Fungal disease present. Left foot:      Skin integrity: Skin integrity normal.      Toenail Condition: Left toenails are abnormally thick and long. Fungal disease present. Comments: Left great toe MTP joint, edematous, tender, erythema, warm. Skin:     General: Skin is warm and dry. Capillary Refill: Capillary refill takes less than 2 seconds. Neurological:      General: No focal deficit present. Mental Status: He is alert and oriented to person, place, and time. ASSESSMENT/PLAN:  1. Idiopathic gout, unspecified chronicity, unspecified site  Start - SEDIMENTATION RATE; Future  - indomethacin (INDOCIN) 25 MG capsule; Take 1 capsule by mouth 3 times daily for 5 days  Dispense: 15 capsule; Refill: 0  Start - methylPREDNISolone (MEDROL DOSEPACK) 4 MG tablet; Take by mouth. Dispense: 1 kit; Refill: 0  Start - ibuprofen (ADVIL;MOTRIN) 800 MG tablet; Take 1 tablet by mouth every 8 hours as needed for Pain  Dispense: 90 tablet; Refill: 0  Dietary modifications:  · Avoid organ meats high in purine  · Avoiding foods and beverages containing high fructose corn syrup  · Reducing or eliminating alcohold use  · Limiting seafood high in purine  · Fluid intake of 3 liters per day. · Eliminate smoking  Exercise regularly    2.  Essential hypertension  Discussed with patient his elevated readings

## 2020-12-10 DIAGNOSIS — Z13.1 DIABETES MELLITUS SCREENING: ICD-10-CM

## 2020-12-10 DIAGNOSIS — Z11.4 ENCOUNTER FOR SCREENING FOR HIV: ICD-10-CM

## 2020-12-10 DIAGNOSIS — B17.10 ACUTE HEPATITIS C VIRUS INFECTION WITHOUT HEPATIC COMA: ICD-10-CM

## 2020-12-10 LAB
A/G RATIO: 1.7 (ref 1.1–2.2)
ALBUMIN SERPL-MCNC: 4.3 G/DL (ref 3.4–5)
ALP BLD-CCNC: 106 U/L (ref 40–129)
ALT SERPL-CCNC: 26 U/L (ref 10–40)
ANION GAP SERPL CALCULATED.3IONS-SCNC: 11 MMOL/L (ref 3–16)
AST SERPL-CCNC: 31 U/L (ref 15–37)
BASOPHILS ABSOLUTE: 0 K/UL (ref 0–0.2)
BASOPHILS RELATIVE PERCENT: 0.2 %
BILIRUB SERPL-MCNC: 0.5 MG/DL (ref 0–1)
BUN BLDV-MCNC: 8 MG/DL (ref 7–20)
CALCIUM SERPL-MCNC: 9.5 MG/DL (ref 8.3–10.6)
CHLORIDE BLD-SCNC: 100 MMOL/L (ref 99–110)
CHOLESTEROL, TOTAL: 153 MG/DL (ref 0–199)
CO2: 30 MMOL/L (ref 21–32)
CREAT SERPL-MCNC: 0.8 MG/DL (ref 0.9–1.3)
EOSINOPHILS ABSOLUTE: 0.1 K/UL (ref 0–0.6)
EOSINOPHILS RELATIVE PERCENT: 1.8 %
ESTIMATED AVERAGE GLUCOSE: 102.5 MG/DL
ETHANOL: NORMAL MG/DL (ref 0–0.08)
GFR AFRICAN AMERICAN: >60
GFR NON-AFRICAN AMERICAN: >60
GLOBULIN: 2.5 G/DL
GLUCOSE BLD-MCNC: 102 MG/DL (ref 70–99)
HBA1C MFR BLD: 5.2 %
HCT VFR BLD CALC: 44.7 % (ref 40.5–52.5)
HDLC SERPL-MCNC: 32 MG/DL (ref 40–60)
HEMATOLOGY PATH CONSULT: NO
HEMOGLOBIN: 15.6 G/DL (ref 13.5–17.5)
HEPATITIS C ANTIBODY INTERPRETATION: NORMAL
HIV AG/AB: NORMAL
HIV ANTIGEN: NORMAL
HIV-1 ANTIBODY: NORMAL
HIV-2 AB: NORMAL
LDL CHOLESTEROL CALCULATED: 94 MG/DL
LYMPHOCYTES ABSOLUTE: 1.8 K/UL (ref 1–5.1)
LYMPHOCYTES RELATIVE PERCENT: 33.4 %
MCH RBC QN AUTO: 42.2 PG (ref 26–34)
MCHC RBC AUTO-ENTMCNC: 34.8 G/DL (ref 31–36)
MCV RBC AUTO: 121.2 FL (ref 80–100)
MONOCYTES ABSOLUTE: 0.5 K/UL (ref 0–1.3)
MONOCYTES RELATIVE PERCENT: 9.5 %
NEUTROPHILS ABSOLUTE: 2.9 K/UL (ref 1.7–7.7)
NEUTROPHILS RELATIVE PERCENT: 55.1 %
PDW BLD-RTO: 19.2 % (ref 12.4–15.4)
PLATELET # BLD: 115 K/UL (ref 135–450)
PMV BLD AUTO: 8.8 FL (ref 5–10.5)
POTASSIUM SERPL-SCNC: 4.1 MMOL/L (ref 3.5–5.1)
PROSTATE SPECIFIC ANTIGEN: 3.52 NG/ML (ref 0–4)
RBC # BLD: 3.69 M/UL (ref 4.2–5.9)
SEDIMENTATION RATE, ERYTHROCYTE: 9 MM/HR (ref 0–20)
SODIUM BLD-SCNC: 141 MMOL/L (ref 136–145)
TOTAL PROTEIN: 6.8 G/DL (ref 6.4–8.2)
TRIGL SERPL-MCNC: 133 MG/DL (ref 0–150)
TSH REFLEX: 2.91 UIU/ML (ref 0.27–4.2)
URIC ACID, SERUM: 5.7 MG/DL (ref 3.5–7.2)
VLDLC SERPL CALC-MCNC: 27 MG/DL
WBC # BLD: 5.3 K/UL (ref 4–11)

## 2020-12-11 ENCOUNTER — TELEPHONE (OUTPATIENT)
Dept: FAMILY MEDICINE CLINIC | Age: 55
End: 2020-12-11

## 2020-12-11 RX ORDER — GABAPENTIN 400 MG/1
800 CAPSULE ORAL 3 TIMES DAILY
Qty: 180 CAPSULE | Refills: 5 | Status: SHIPPED | OUTPATIENT
Start: 2020-12-11 | End: 2021-02-17 | Stop reason: SDUPTHER

## 2020-12-11 RX ORDER — LISINOPRIL 20 MG/1
20 TABLET ORAL DAILY
Qty: 90 TABLET | Refills: 3 | Status: SHIPPED | OUTPATIENT
Start: 2020-12-11 | End: 2022-07-18

## 2020-12-11 NOTE — PROGRESS NOTES
Discussed with Dr. Yevgeniy Alexander to start patient on HTN medication. No change to dose of gabapentin at this time. Patients dose is to high now, will need to wean over next few months.

## 2021-01-03 DIAGNOSIS — M10.00 IDIOPATHIC GOUT, UNSPECIFIED CHRONICITY, UNSPECIFIED SITE: ICD-10-CM

## 2021-01-05 RX ORDER — IBUPROFEN 800 MG/1
800 TABLET ORAL EVERY 8 HOURS PRN
Qty: 90 TABLET | Refills: 0 | Status: SHIPPED | OUTPATIENT
Start: 2021-01-05 | End: 2022-07-18

## 2021-02-17 DIAGNOSIS — G60.9 IDIOPATHIC PERIPHERAL NEUROPATHY: ICD-10-CM

## 2021-02-17 RX ORDER — GABAPENTIN 400 MG/1
800 CAPSULE ORAL 3 TIMES DAILY
Qty: 180 CAPSULE | Refills: 5 | Status: SHIPPED | OUTPATIENT
Start: 2021-02-17 | End: 2021-02-18 | Stop reason: SDUPTHER

## 2021-02-17 NOTE — TELEPHONE ENCOUNTER
Medication and Quantity requested:     Gabapentin 600MG Tab #90      Last Visit  12/9/20    Pharmacy and phone number updated in EPIC:  Yes, [facility]

## 2021-02-17 NOTE — TELEPHONE ENCOUNTER
Medication:   Requested Prescriptions     Pending Prescriptions Disp Refills    gabapentin (NEURONTIN) 400 MG capsule 180 capsule 5     Sig: Take 2 capsules by mouth 3 times daily for 180 days.  Intended supply: 30 days        Last Filled:  12/11/20 #180, 5 RF     Patient Phone Number: [phone] (home)     Last appt: 12/9/2020 gout  Next appt: Visit date not found

## 2021-02-18 RX ORDER — GABAPENTIN 400 MG/1
800 CAPSULE ORAL 3 TIMES DAILY
Qty: 180 CAPSULE | Refills: 5 | Status: SHIPPED | OUTPATIENT
Start: 2021-02-18 | End: 2021-04-12 | Stop reason: SDUPTHER

## 2021-03-21 ENCOUNTER — APPOINTMENT (OUTPATIENT)
Dept: GENERAL RADIOLOGY | Age: 56
End: 2021-03-21
Payer: COMMERCIAL

## 2021-03-21 ENCOUNTER — APPOINTMENT (OUTPATIENT)
Dept: CT IMAGING | Age: 56
End: 2021-03-21
Payer: COMMERCIAL

## 2021-03-21 ENCOUNTER — HOSPITAL ENCOUNTER (EMERGENCY)
Age: 56
Discharge: LEFT BEFORE DISCHARGE | End: 2021-03-21
Attending: EMERGENCY MEDICINE
Payer: COMMERCIAL

## 2021-03-21 VITALS
WEIGHT: 220 LBS | HEART RATE: 132 BPM | RESPIRATION RATE: 22 BRPM | HEIGHT: 75 IN | TEMPERATURE: 100.6 F | BODY MASS INDEX: 27.35 KG/M2 | DIASTOLIC BLOOD PRESSURE: 93 MMHG | OXYGEN SATURATION: 95 % | SYSTOLIC BLOOD PRESSURE: 147 MMHG

## 2021-03-21 DIAGNOSIS — N17.9 ACUTE KIDNEY INJURY (HCC): ICD-10-CM

## 2021-03-21 DIAGNOSIS — A41.9 SEPTICEMIA (HCC): ICD-10-CM

## 2021-03-21 DIAGNOSIS — E83.42 HYPOMAGNESEMIA: ICD-10-CM

## 2021-03-21 DIAGNOSIS — S01.01XA LACERATION OF SCALP, INITIAL ENCOUNTER: ICD-10-CM

## 2021-03-21 DIAGNOSIS — S09.90XA INJURY OF HEAD, INITIAL ENCOUNTER: Primary | ICD-10-CM

## 2021-03-21 LAB
A/G RATIO: 1.6 (ref 1.1–2.2)
ALBUMIN SERPL-MCNC: 4.8 G/DL (ref 3.4–5)
ALP BLD-CCNC: 118 U/L (ref 40–129)
ALT SERPL-CCNC: 20 U/L (ref 10–40)
AMYLASE: 14 U/L (ref 25–115)
ANION GAP SERPL CALCULATED.3IONS-SCNC: 13 MMOL/L (ref 3–16)
ANISOCYTOSIS: ABNORMAL
AST SERPL-CCNC: 74 U/L (ref 15–37)
BASOPHILS ABSOLUTE: 0 K/UL (ref 0–0.2)
BASOPHILS RELATIVE PERCENT: 0.4 %
BILIRUB SERPL-MCNC: 3.2 MG/DL (ref 0–1)
BUN BLDV-MCNC: 22 MG/DL (ref 7–20)
CALCIUM SERPL-MCNC: 9.5 MG/DL (ref 8.3–10.6)
CHLORIDE BLD-SCNC: 85 MMOL/L (ref 99–110)
CO2: 36 MMOL/L (ref 21–32)
CREAT SERPL-MCNC: 1.7 MG/DL (ref 0.9–1.3)
EKG ATRIAL RATE: 139 BPM
EKG DIAGNOSIS: NORMAL
EKG P AXIS: 34 DEGREES
EKG P-R INTERVAL: 114 MS
EKG Q-T INTERVAL: 292 MS
EKG QRS DURATION: 86 MS
EKG QTC CALCULATION (BAZETT): 444 MS
EKG R AXIS: -68 DEGREES
EKG T AXIS: 85 DEGREES
EKG VENTRICULAR RATE: 139 BPM
EOSINOPHILS ABSOLUTE: 0 K/UL (ref 0–0.6)
EOSINOPHILS RELATIVE PERCENT: 0.2 %
ETHANOL: NORMAL MG/DL (ref 0–0.08)
GFR AFRICAN AMERICAN: 51
GFR NON-AFRICAN AMERICAN: 42
GLOBULIN: 3 G/DL
GLUCOSE BLD-MCNC: 128 MG/DL (ref 70–99)
HCT VFR BLD CALC: 46.2 % (ref 40.5–52.5)
HEMATOLOGY PATH CONSULT: YES
HEMOGLOBIN: 15.9 G/DL (ref 13.5–17.5)
LACTIC ACID, SEPSIS: NORMAL MMOL/L (ref 0.4–1.9)
LIPASE: 14 U/L (ref 13–60)
LYMPHOCYTES ABSOLUTE: 1.2 K/UL (ref 1–5.1)
LYMPHOCYTES RELATIVE PERCENT: 12.6 %
MACROCYTES: ABNORMAL
MAGNESIUM: 0.9 MG/DL (ref 1.8–2.4)
MCH RBC QN AUTO: 39.1 PG (ref 26–34)
MCHC RBC AUTO-ENTMCNC: 34.5 G/DL (ref 31–36)
MCV RBC AUTO: 113.5 FL (ref 80–100)
MONOCYTES ABSOLUTE: 0.4 K/UL (ref 0–1.3)
MONOCYTES RELATIVE PERCENT: 4.3 %
NEUTROPHILS ABSOLUTE: 7.8 K/UL (ref 1.7–7.7)
NEUTROPHILS RELATIVE PERCENT: 82.5 %
PDW BLD-RTO: 18.9 % (ref 12.4–15.4)
PLATELET # BLD: 83 K/UL (ref 135–450)
PLATELET SLIDE REVIEW: ABNORMAL
PMV BLD AUTO: 10 FL (ref 5–10.5)
POTASSIUM REFLEX MAGNESIUM: 3.4 MMOL/L (ref 3.5–5.1)
PRO-BNP: 325 PG/ML (ref 0–124)
RBC # BLD: 4.07 M/UL (ref 4.2–5.9)
REASON FOR REJECTION: NORMAL
REJECTED TEST: NORMAL
SLIDE REVIEW: ABNORMAL
SODIUM BLD-SCNC: 134 MMOL/L (ref 136–145)
TOTAL PROTEIN: 7.8 G/DL (ref 6.4–8.2)
TROPONIN: <0.01 NG/ML
WBC # BLD: 9.5 K/UL (ref 4–11)

## 2021-03-21 PROCEDURE — 87040 BLOOD CULTURE FOR BACTERIA: CPT

## 2021-03-21 PROCEDURE — 36415 COLL VENOUS BLD VENIPUNCTURE: CPT

## 2021-03-21 PROCEDURE — 83605 ASSAY OF LACTIC ACID: CPT

## 2021-03-21 PROCEDURE — 80053 COMPREHEN METABOLIC PANEL: CPT

## 2021-03-21 PROCEDURE — 99284 EMERGENCY DEPT VISIT MOD MDM: CPT

## 2021-03-21 PROCEDURE — 93010 ELECTROCARDIOGRAM REPORT: CPT | Performed by: INTERNAL MEDICINE

## 2021-03-21 PROCEDURE — 2580000003 HC RX 258: Performed by: EMERGENCY MEDICINE

## 2021-03-21 PROCEDURE — 71045 X-RAY EXAM CHEST 1 VIEW: CPT

## 2021-03-21 PROCEDURE — 12011 RPR F/E/E/N/L/M 2.5 CM/<: CPT

## 2021-03-21 PROCEDURE — 82077 ASSAY SPEC XCP UR&BREATH IA: CPT

## 2021-03-21 PROCEDURE — 72125 CT NECK SPINE W/O DYE: CPT

## 2021-03-21 PROCEDURE — 83690 ASSAY OF LIPASE: CPT

## 2021-03-21 PROCEDURE — 85025 COMPLETE CBC W/AUTO DIFF WBC: CPT

## 2021-03-21 PROCEDURE — 70450 CT HEAD/BRAIN W/O DYE: CPT

## 2021-03-21 PROCEDURE — 93005 ELECTROCARDIOGRAM TRACING: CPT | Performed by: EMERGENCY MEDICINE

## 2021-03-21 PROCEDURE — 70486 CT MAXILLOFACIAL W/O DYE: CPT

## 2021-03-21 PROCEDURE — 84484 ASSAY OF TROPONIN QUANT: CPT

## 2021-03-21 PROCEDURE — 83880 ASSAY OF NATRIURETIC PEPTIDE: CPT

## 2021-03-21 PROCEDURE — 82150 ASSAY OF AMYLASE: CPT

## 2021-03-21 PROCEDURE — 83735 ASSAY OF MAGNESIUM: CPT

## 2021-03-21 RX ORDER — CEPHALEXIN 500 MG/1
500 CAPSULE ORAL 4 TIMES DAILY
Qty: 40 CAPSULE | Refills: 0 | Status: SHIPPED | OUTPATIENT
Start: 2021-03-21 | End: 2021-03-23

## 2021-03-21 RX ORDER — MAGNESIUM SULFATE IN WATER 40 MG/ML
2000 INJECTION, SOLUTION INTRAVENOUS ONCE
Status: DISCONTINUED | OUTPATIENT
Start: 2021-03-21 | End: 2021-03-21 | Stop reason: HOSPADM

## 2021-03-21 RX ORDER — 0.9 % SODIUM CHLORIDE 0.9 %
1000 INTRAVENOUS SOLUTION INTRAVENOUS ONCE
Status: COMPLETED | OUTPATIENT
Start: 2021-03-21 | End: 2021-03-21

## 2021-03-21 RX ORDER — LORAZEPAM 2 MG/ML
1 INJECTION INTRAMUSCULAR ONCE
Status: DISCONTINUED | OUTPATIENT
Start: 2021-03-21 | End: 2021-03-21 | Stop reason: HOSPADM

## 2021-03-21 RX ADMIN — SODIUM CHLORIDE 1000 ML: 9 INJECTION, SOLUTION INTRAVENOUS at 03:10

## 2021-03-21 NOTE — ED NOTES
AMA paper signed, witnessed by myself and Ganga Ross RN. Pt ambulated out of ER with family member.       Chel Good RN  03/21/21 0928

## 2021-03-21 NOTE — ED NOTES
Pt refusing anymore bloodwork and states he doesn't have to urinate at this time. Dr. Rinaldi Pouch aware.      Sarabjit Membreno RN  03/21/21 9453

## 2021-03-21 NOTE — ED PROVIDER NOTES
daily         ALLERGIES     Patient has no known allergies. FAMILYHISTORY       Family History   Problem Relation Age of Onset    Cancer Father         lung    Diabetes Maternal Grandmother     Diabetes Maternal Grandfather     Diabetes Paternal Grandmother     Diabetes Paternal Grandfather           SOCIAL HISTORY       Social History     Socioeconomic History    Marital status:      Spouse name: None    Number of children: None    Years of education: None    Highest education level: None   Occupational History    None   Social Needs    Financial resource strain: None    Food insecurity     Worry: None     Inability: None    Transportation needs     Medical: None     Non-medical: None   Tobacco Use    Smoking status: Current Every Day Smoker     Packs/day: 2.00     Years: 10.00     Pack years: 20.00     Types: Cigarettes    Smokeless tobacco: Never Used   Substance and Sexual Activity    Alcohol use: Not Currently     Comment: daily - 6 pack per day  and 1L jag per day    Drug use: No    Sexual activity: None   Lifestyle    Physical activity     Days per week: None     Minutes per session: None    Stress: None   Relationships    Social connections     Talks on phone: None     Gets together: None     Attends Christian service: None     Active member of club or organization: None     Attends meetings of clubs or organizations: None     Relationship status: None    Intimate partner violence     Fear of current or ex partner: None     Emotionally abused: None     Physically abused: None     Forced sexual activity: None   Other Topics Concern    None   Social History Narrative    None       SCREENINGS             PHYSICAL EXAM    (up to 7 for level 4, 8 or more for level 5)     ED Triage Vitals   BP Temp Temp src Pulse Resp SpO2 Height Weight   -- -- -- -- -- -- -- --       Physical Exam    General Appearance:  Alert, cooperative, no distress, appears stated age.    Head: Narrative:     Performed at:  [facility] [address], [facility], [address]   Phone [phone]   AMYLASE - Abnormal; Notable for the following components:    Amylase 14 (*)     All other components within normal limits    Narrative:     Performed at:  [facility] [address], [facility], [address]   Phone [phone]   MAGNESIUM - Abnormal; Notable for the following components:    Magnesium 0.90 (*)     All other components within normal limits    Narrative:     Kg CARRASCO tel. [phone],  Chemistry results called to and read back by WILLIAM Easley, 03/21/2021  04:17, by Doug Campoverde  Performed at:  [facility] [address], [facility], [address]   Phone [phone]   CULTURE, URINE   CULTURE, BLOOD 1   CULTURE, BLOOD 2   LACTATE, SEPSIS    Narrative:     Kg MAGANA tel. [phone],  Rejected Test CMPX, BNP, LILI, LIPAS, TROP, ETOH  Called to:WILLIAM Easley,  03/21/2021 03:26, by Doug Campoverde  Performed at:  [facility] [address], [facility], [address]   Phone [phone]    Narrative:     Kg CARRASCO tel. [phone],  Rejected Test CMPX, BNP, LILI, LIPAS, TROP, ETOH  Called to:WILLIAM Easley,  03/21/2021 03:26, by Doug Campoverde  Performed at:  [facility] [address], [facility], [address]   Phone [phone]   TROPONIN    Narrative:     Performed at:  [facility] [address], [facility], [address]   Phone [phone]   LIPASE    Narrative:     Performed at:  [facility] [address], [facility], [address]   Phone [phone]   ETHANOL    Narrative:     Performed at:  [facility] [address], [facility], [address]   Phone [phone]   LACTATE, [address]    COVID-19   COVID-19       All other labs were within normal range or not returned as of this dictation. EKG: All EKG's are interpreted by the Emergency Department Physician in the absence of a cardiologist.  Please see their note for interpretation of EKG. EKG reviewed by myself  Dated today, 0302  Rate 139, sinus tach,  Rate increased from prior    RADIOLOGY:   Non-plain film images such as CT, Ultrasound and MRI are read by the radiologist. Plain radiographic images are visualized andpreliminarily interpreted by the  ED Provider with the below findings:        Interpretation pert Radiologist below, if available at the time of this note:    XR CHEST PORTABLE   Final Result   No radiographic evidence of acute cardiopulmonary disease. CT FACIAL BONES WO CONTRAST   Final Result   No definite acute intracranial abnormality. The study is somewhat limited by   motion artifact and beam hardening artifact which partially obscures the   middle and posterior cranial fossa. No facial fracture. Bilateral supraorbital and focal left frontal soft tissue swelling. CT Cervical Spine WO Contrast   Final Result   No acute abnormality of the cervical spine. CT Head WO Contrast   Final Result   No definite acute intracranial abnormality. The study is somewhat limited by   motion artifact and beam hardening artifact which partially obscures the   middle and posterior cranial fossa. No facial fracture. Bilateral supraorbital and focal left frontal soft tissue swelling. No results found. PROCEDURES   Unless otherwise noted below, none     Lac Repair    Date/Time: 3/21/2021 3:59 AM  Performed by: Brennan Segovia MD  Authorized by: Brennan Segovia MD     Consent:     Consent obtained:  Verbal    Consent given by:  Patient  Anesthesia (see MAR for exact dosages):      Anesthesia method:  Local infiltration    Local anesthetic:  Lidocaine 1% WITH epi  Laceration details:     Location:  Face    Face location:  L eyebrow    Length (cm):  2  Repair type:     Repair type:  Simple  Pre-procedure details:     Preparation:  Patient was prepped and draped in usual sterile fashion  Exploration:     Hemostasis achieved with:  Direct pressure    Wound exploration: wound explored through full range of motion and entire depth of wound probed and visualized      Contaminated: no    Treatment:     Area cleansed with:  Hibiclens    Amount of cleaning:  Standard  Skin repair:     Repair method:  Sutures    Suture size:  4-0    Suture material:  Nylon    Suture technique:  Simple interrupted    Number of sutures:  2  Approximation:     Approximation:  Close  Post-procedure details:     Dressing:  Adhesive bandage    Patient tolerance of procedure: Tolerated well, no immediate complications        CRITICAL CARE TIME   N/A    CONSULTS:  None      EMERGENCY DEPARTMENT COURSE and DIFFERENTIAL DIAGNOSIS/MDM:   Vitals:    Vitals:    03/21/21 0238   BP: (!) 137/111   Pulse: 136   Resp: 22   Temp: 100.6 °F (38.1 °C)   TempSrc: Oral   SpO2: 97%   Weight: 220 lb (99.8 kg)   Height: 6' 3\" (1.905 m)       Patient was given thefollowing medications:  Medications   lidocaine-EPINEPHrine 1 percent-1:[id number] injection 20 mL (has no administration in time range)   LORazepam (ATIVAN) injection 1 mg (has no administration in time range)   magnesium sulfate 2000 mg in 50 mL IVPB premix (has no administration in time range)   0.9 % sodium chloride bolus (1,000 mLs Intravenous New Bag 3/21/21 0310)       55-year-old male presenting today as alcohol intoxication with a fall head injury and facial laceration. Obtain CTs of his head C-spine and facial bones and repair his laceration. He is found to be quite tachycardic to 136, and febrile as well. Start septic work-up. Found to have an acute kidney injury, hypomagnesemia. X-ray did not show nothing infectious. He could not give a urine as a source.   I did discuss with patient that given the above findings of his head injury is LITTLE, septicemia, hypomagnesemia, concern of alcohol withdrawal as well, and I would like to keep in the hospital. He declined to stay. He can make his own judgments. I had a omega conversation with him in front of his wife. He is an alcoholic is nontender in the past few days and is now in withdrawal.  I will start him on some benzodiazepines. He is febrile this by definition septic. I am not finding obvious infectious source, this may be from alcohol withdrawal but put him on antibiotic coverage give these not come a urine sample yet given the fact wound is well. Advised him he can return anytime he wants were open 24/7 365 and happy to see him if he comes back. FINAL IMPRESSION      1. Injury of head, initial encounter    2. Laceration of scalp, initial encounter    3. Acute kidney injury ([facility] [address]    4. Septicemia ([facility] [address]    5. Hypomagnesemia          DISPOSITION/PLAN   DISPOSITION        PATIENT REFERREDTO:  Shreya Coleman MD  [address] [place] [id number] [phone]            DISCHARGE MEDICATIONS:  New Prescriptions    CEPHALEXIN (KEFLEX) 500 MG CAPSULE    Take 1 capsule by mouth 4 times daily for 10 days       DISCONTINUED MEDICATIONS:  Discontinued Medications    No medications on file              (Please note that portions ofthis note were completed with a voice recognition program.  Efforts were made to edit the dictations but occasionally words are mis-transcribed.)    Troy Dowling MD (electronically signed)           Troy Dowling MD  03/21/21 0243

## 2021-03-21 NOTE — ED NOTES
Pt states he is fine and he is leaving. Extensive conversation had with patient about leaving AMA. Family at bedside and agreeable with pt leaving. Dr. Cherelle Weaver at bedside explained AMA process and risks of leaving. Pt A&Ox4. Pt ambulating by self. Pt aware of magnesium level and concerns with mag level being that low. Pt still wanting to leave.       Sherley Berry RN  03/21/21 1429

## 2021-03-21 NOTE — ED NOTES
Pt refusing ativan and magnesium IV. Dr. Trevor Kamara in to see patient.       Lea Iniguez RN  03/21/21 5192

## 2021-03-22 LAB — HEMATOLOGY PATH CONSULT: NORMAL

## 2021-03-23 ENCOUNTER — TELEPHONE (OUTPATIENT)
Dept: FAMILY MEDICINE CLINIC | Age: 56
End: 2021-03-23

## 2021-03-23 ENCOUNTER — OFFICE VISIT (OUTPATIENT)
Dept: FAMILY MEDICINE CLINIC | Age: 56
End: 2021-03-23
Payer: COMMERCIAL

## 2021-03-23 VITALS
DIASTOLIC BLOOD PRESSURE: 88 MMHG | TEMPERATURE: 96.4 F | BODY MASS INDEX: 25.5 KG/M2 | WEIGHT: 204 LBS | SYSTOLIC BLOOD PRESSURE: 130 MMHG | HEART RATE: 108 BPM | OXYGEN SATURATION: 98 %

## 2021-03-23 DIAGNOSIS — N17.9 ACUTE KIDNEY INJURY (NONTRAUMATIC) (HCC): ICD-10-CM

## 2021-03-23 DIAGNOSIS — R50.9 FEVER, UNSPECIFIED: ICD-10-CM

## 2021-03-23 DIAGNOSIS — R09.89 CHEST CONGESTION: ICD-10-CM

## 2021-03-23 DIAGNOSIS — N52.9 ERECTILE DYSFUNCTION, UNSPECIFIED ERECTILE DYSFUNCTION TYPE: ICD-10-CM

## 2021-03-23 DIAGNOSIS — R05.9 COUGH: ICD-10-CM

## 2021-03-23 DIAGNOSIS — E83.42 HYPOMAGNESEMIA: ICD-10-CM

## 2021-03-23 DIAGNOSIS — S01.81XD FACIAL LACERATION, SUBSEQUENT ENCOUNTER: Primary | ICD-10-CM

## 2021-03-23 PROCEDURE — G8484 FLU IMMUNIZE NO ADMIN: HCPCS | Performed by: FAMILY MEDICINE

## 2021-03-23 PROCEDURE — 99214 OFFICE O/P EST MOD 30 MIN: CPT | Performed by: FAMILY MEDICINE

## 2021-03-23 PROCEDURE — 4004F PT TOBACCO SCREEN RCVD TLK: CPT | Performed by: FAMILY MEDICINE

## 2021-03-23 PROCEDURE — 3017F COLORECTAL CA SCREEN DOC REV: CPT | Performed by: FAMILY MEDICINE

## 2021-03-23 PROCEDURE — G8419 CALC BMI OUT NRM PARAM NOF/U: HCPCS | Performed by: FAMILY MEDICINE

## 2021-03-23 PROCEDURE — G8427 DOCREV CUR MEDS BY ELIG CLIN: HCPCS | Performed by: FAMILY MEDICINE

## 2021-03-23 RX ORDER — SILDENAFIL 50 MG/1
50 TABLET, FILM COATED ORAL DAILY PRN
Qty: 30 TABLET | Refills: 1 | Status: SHIPPED | OUTPATIENT
Start: 2021-03-23 | End: 2021-05-13

## 2021-03-23 RX ORDER — DOXYCYCLINE HYCLATE 100 MG
100 TABLET ORAL 2 TIMES DAILY
Qty: 20 TABLET | Refills: 0 | Status: SHIPPED | OUTPATIENT
Start: 2021-03-23 | End: 2021-04-02

## 2021-03-23 NOTE — TELEPHONE ENCOUNTER
Pt has an rx for cephalexin 500mg from the hospital and dr Dominique Mc gave him a new rx for a different medication today. He wants to know which one to take.

## 2021-03-23 NOTE — PATIENT INSTRUCTIONS
Patient Education        Acute Kidney Injury: Care Instructions  Your Care Instructions     Acute kidney injury (LITTLE) is a sudden decrease in kidney function. This can happen over a period of hours, days or, in some cases, weeks. LITTLE used to be called acute renal failure, but kidney failure doesn't always happen with LITTLE. Common causes of LITTLE are dehydration, blood loss, and medicines. When LITTLE happens, the kidneys have trouble removing waste and excess fluids from the body. The waste and fluids build up and become harmful. Kidney function may return to normal if the cause of LITTLE is treated quickly. Your chance of a full recovery depends on what caused the problem, how quickly the cause was treated, and what other medical problems you have. A machine may be used to help your kidneys remove waste and fluids for a short period of time. This is called dialysis. Follow-up care is a key part of your treatment and safety. Be sure to make and go to all appointments, and call your doctor if you are having problems. It's also a good idea to know your test results and keep a list of the medicines you take. How can you care for yourself at home? · Talk to your doctor about how much fluid you should drink. · Eat a balanced diet. Talk to your doctor or a dietitian about what type of diet may be best for you. You may need to limit sodium, potassium, and phosphorus. · If you need dialysis, follow the instructions and schedule for dialysis that your doctor gives you. · Do not smoke. Smoking can make your condition worse. If you need help quitting, talk to your doctor about stop-smoking programs and medicines. These can increase your chances of quitting for good. · Do not drink alcohol. · Review all of your medicines with your doctor. Do not take any medicines, including nonsteroidal anti-inflammatory drugs (NSAIDs) such as ibuprofen (Advil, Motrin) or naproxen (Aleve), unless your doctor says it is safe for you to do so. · Make sure that anyone treating you for any health problem knows that you have had LITTLE. When should you call for help? Call 911 anytime you think you may need emergency care. For example, call if:    · You passed out (lost consciousness). Call your doctor now or seek immediate medical care if:    · You have new or worse nausea and vomiting.     · You have much less urine than normal, or you have no urine.     · You are feeling confused or cannot think clearly.     · You have new or more blood in your urine.     · You have new swelling.     · You are dizzy or lightheaded, or feel like you may faint. Watch closely for changes in your health, and be sure to contact your doctor if:    · You do not get better as expected. Where can you learn more? Go to https://[employer] org and sign in to your [employer] account. Enter R702 in the "[employer] box to learn more about \"Acute Kidney Injury: Care Instructions. \"     If you do not have an account, please click on the \"Sign Up Now\" link. Current as of: December 17, 2020               Content Version: 12.8  © 3411-6110 [employer], [employer]. Care instructions adapted under license by [facility] ([facility]). If you have questions about a medical condition or this instruction, always ask your healthcare professional. [address] any warranty or liability for your use of this information.

## 2021-03-23 NOTE — PROGRESS NOTES
disease     Hyperlipidemia     Neuropathy, peripheral     Alcohol abuse        Past Medical History:   Diagnosis Date    Alcohol abuse     Gout     Hepatic disease     Hyperlipidemia     Hypertension     Neuropathy, peripheral        Current Outpatient Medications   Medication Sig Dispense Refill    doxycycline hyclate (VIBRA-TABS) 100 MG tablet Take 1 tablet by mouth 2 times daily for 10 days 20 tablet 0    sildenafil (VIAGRA) 50 MG tablet Take 1 tablet by mouth daily as needed for Erectile Dysfunction 30 tablet 1    gabapentin (NEURONTIN) 400 MG capsule Take 2 capsules by mouth 3 times daily for 180 days. Intended supply: 30 days 180 capsule 5    ibuprofen (ADVIL;MOTRIN) 800 MG tablet TAKE 1 TABLET BY MOUTH EVERY 8 HOURS AS NEEDED FOR PAIN 90 tablet 0    lisinopril (PRINIVIL;ZESTRIL) 20 MG tablet Take 1 tablet by mouth daily 90 tablet 3    indomethacin (INDOCIN) 25 MG capsule Take 1 capsule by mouth 3 times daily for 5 days 15 capsule 0     No current facility-administered medications for this visit. No Known Allergies    Review of Systems   No HA, no seizure, no SOB    Vitals:  /88   Pulse 108   Temp 96.4 °F (35.8 °C)   Wt 204 lb (92.5 kg)   SpO2 98%   BMI 25.50 kg/m²     Physical Exam   General:  Well-appearing, NAD, alert, non-toxic  HEENT:  Normocephalic. Pupils equal and round. +contusions under both eyes. +2cm healing laceration above L eyebrow. +superficial abrasion of bridge of nose and L temple and L nose. No drainage noted. CHEST/LUNGS: CTAB, no crackles, no wheeze, no rhonchi.  Symmetric rise  CARDIOVASCULAR: RRR,  no murmur, no rub  EXTREMETIES: Normal movement of all extremities  SKIN:  No rash, no cellulitis, no bruising, no petechiae/purpura/vesicles/pustules/abscess  PSYCH:  A+O x 3; normal affect  NEURO:  GCS 15, CN2-12 grossly intact, no focal motor/sensory deficits, no cerebellar deficits, normal gait, normal speech      Assessment/Plan     75-year-old male here for ED follow-up. Patient appears to be recovering well with no sign of intracranial process or concussion. Facial laceration appears to be healing. Recommend following up in 3 days for suture removal.    Patient has a cough with rhonchi heard on lungs. Given his fever noted in the ED 3 days ago, concerning for possible lung infection/pneumonia. Will treat with doxycycline. Breathing is stable at this time. Patient had acute kidney injury as well as hypomagnesemia in the ED. Will check a CMP to ensure resolution of these abnormalities. The patient desires Viagra to treat his erectile dysfunction. History and physical exam has not disclosed any obvious treatable cause of this complaint. He is informed that Viagra is sometimes not covered by insurance. It is available on a fee-for-service cost basis, and is relatively expensive. He can start with 50 mg dose, and increase to 100 mg if necessary. The method of use 1 hour prior to anticipated intercourse is explained. He should not use any more than one tablet in a 24 hour period. The side effects of possible headache, flushing, dyspepsia and transient changes in vision have been explained. The patient is not taking nitrates, and denies he has access to nitrates in any form at any time. I have counseled him that taking Viagra with nitrates of any form can cause death. Additionally, Viagra serum concentrations can be increased by the following: cimetidine, erythromycin, itraconazole or ketoconazole. This patient does not take these drugs, but I have counseled him to avoid Viagra if he does take any of these. We have also discussed the fact that there have been some deaths in patients after taking Viagra, felt due to the exertion of intercourse rather than the drug itself. The patient is aware of this, and accepts whatever unknown degree of risk there is in this aspect.     Discussed with patient medication/s dosage, instructions, potential S/E, A/R and Drug Interaction  Tylenol or Motrin as needed for pain or fever  Advise to return here if worse or go to nearest ER  Encourage fluids  Pt discharged in stable condition at 13:55        1. Facial laceration, subsequent encounter      2. Acute kidney injury (nontraumatic) (HCC)    - COMPREHENSIVE METABOLIC PANEL; Future    3. Hypomagnesemia    - COMPREHENSIVE METABOLIC PANEL; Future    4. Chest congestion    - doxycycline hyclate (VIBRA-TABS) 100 MG tablet; Take 1 tablet by mouth 2 times daily for 10 days  Dispense: 20 tablet; Refill: 0    5. Cough    - doxycycline hyclate (VIBRA-TABS) 100 MG tablet; Take 1 tablet by mouth 2 times daily for 10 days  Dispense: 20 tablet; Refill: 0    6. Fever, unspecified    - doxycycline hyclate (VIBRA-TABS) 100 MG tablet; Take 1 tablet by mouth 2 times daily for 10 days  Dispense: 20 tablet; Refill: 0    7. Erectile dysfunction, unspecified erectile dysfunction type    - sildenafil (VIAGRA) 50 MG tablet; Take 1 tablet by mouth daily as needed for Erectile Dysfunction  Dispense: 30 tablet;  Refill: 1         Orders Placed This Encounter   Procedures    COMPREHENSIVE METABOLIC PANEL     Standing Status:   Future     Standing Expiration Date:   3/23/2022       Return in about 3 days (around 3/26/2021) for suture removal.    Reece Arriola MD    3/23/2021  1:53 PM

## 2021-03-24 LAB
A/G RATIO: 1.7 (ref 1.1–2.2)
ALBUMIN SERPL-MCNC: 4.3 G/DL (ref 3.4–5)
ALP BLD-CCNC: 136 U/L (ref 40–129)
ALT SERPL-CCNC: 70 U/L (ref 10–40)
ANION GAP SERPL CALCULATED.3IONS-SCNC: 13 MMOL/L (ref 3–16)
AST SERPL-CCNC: 315 U/L (ref 15–37)
BILIRUB SERPL-MCNC: 1 MG/DL (ref 0–1)
BUN BLDV-MCNC: 22 MG/DL (ref 7–20)
CALCIUM SERPL-MCNC: 9 MG/DL (ref 8.3–10.6)
CHLORIDE BLD-SCNC: 92 MMOL/L (ref 99–110)
CO2: 33 MMOL/L (ref 21–32)
CREAT SERPL-MCNC: 1.4 MG/DL (ref 0.9–1.3)
GFR AFRICAN AMERICAN: >60
GFR NON-AFRICAN AMERICAN: 53
GLOBULIN: 2.5 G/DL
GLUCOSE BLD-MCNC: 105 MG/DL (ref 70–99)
POTASSIUM SERPL-SCNC: 2.6 MMOL/L (ref 3.5–5.1)
SODIUM BLD-SCNC: 138 MMOL/L (ref 136–145)
TOTAL PROTEIN: 6.8 G/DL (ref 6.4–8.2)

## 2021-03-25 LAB
BLOOD CULTURE, ROUTINE: NORMAL
CULTURE, BLOOD 2: NORMAL

## 2021-03-26 ENCOUNTER — OFFICE VISIT (OUTPATIENT)
Dept: FAMILY MEDICINE CLINIC | Age: 56
End: 2021-03-26
Payer: COMMERCIAL

## 2021-03-26 VITALS
DIASTOLIC BLOOD PRESSURE: 86 MMHG | HEIGHT: 75 IN | SYSTOLIC BLOOD PRESSURE: 138 MMHG | WEIGHT: 214 LBS | BODY MASS INDEX: 26.61 KG/M2 | OXYGEN SATURATION: 99 % | TEMPERATURE: 97.3 F | HEART RATE: 82 BPM

## 2021-03-26 DIAGNOSIS — K42.9 UMBILICAL HERNIA WITHOUT OBSTRUCTION AND WITHOUT GANGRENE: ICD-10-CM

## 2021-03-26 DIAGNOSIS — R71.8 ELEVATED MCV: ICD-10-CM

## 2021-03-26 DIAGNOSIS — R79.89 ELEVATED LFTS: ICD-10-CM

## 2021-03-26 DIAGNOSIS — N17.9 ACUTE KIDNEY INJURY (NONTRAUMATIC) (HCC): ICD-10-CM

## 2021-03-26 DIAGNOSIS — N17.9 ACUTE KIDNEY INJURY (NONTRAUMATIC) (HCC): Primary | ICD-10-CM

## 2021-03-26 DIAGNOSIS — S01.81XD FACIAL LACERATION, SUBSEQUENT ENCOUNTER: ICD-10-CM

## 2021-03-26 LAB
A/G RATIO: 1.7 (ref 1.1–2.2)
ALBUMIN SERPL-MCNC: 4 G/DL (ref 3.4–5)
ALP BLD-CCNC: 122 U/L (ref 40–129)
ALT SERPL-CCNC: 42 U/L (ref 10–40)
ANION GAP SERPL CALCULATED.3IONS-SCNC: 9 MMOL/L (ref 3–16)
AST SERPL-CCNC: 65 U/L (ref 15–37)
BILIRUB SERPL-MCNC: 0.9 MG/DL (ref 0–1)
BUN BLDV-MCNC: 12 MG/DL (ref 7–20)
CALCIUM SERPL-MCNC: 8.8 MG/DL (ref 8.3–10.6)
CHLORIDE BLD-SCNC: 99 MMOL/L (ref 99–110)
CO2: 35 MMOL/L (ref 21–32)
CREAT SERPL-MCNC: 0.9 MG/DL (ref 0.9–1.3)
FOLATE: 3.91 NG/ML (ref 4.78–24.2)
GFR AFRICAN AMERICAN: >60
GFR NON-AFRICAN AMERICAN: >60
GLOBULIN: 2.3 G/DL
GLUCOSE BLD-MCNC: 87 MG/DL (ref 70–99)
POTASSIUM SERPL-SCNC: 3.4 MMOL/L (ref 3.5–5.1)
SODIUM BLD-SCNC: 143 MMOL/L (ref 136–145)
TOTAL PROTEIN: 6.3 G/DL (ref 6.4–8.2)
VITAMIN B-12: 412 PG/ML (ref 211–911)
VITAMIN D 25-HYDROXY: 23.4 NG/ML

## 2021-03-26 PROCEDURE — G8419 CALC BMI OUT NRM PARAM NOF/U: HCPCS | Performed by: FAMILY MEDICINE

## 2021-03-26 PROCEDURE — 3017F COLORECTAL CA SCREEN DOC REV: CPT | Performed by: FAMILY MEDICINE

## 2021-03-26 PROCEDURE — 4004F PT TOBACCO SCREEN RCVD TLK: CPT | Performed by: FAMILY MEDICINE

## 2021-03-26 PROCEDURE — G8484 FLU IMMUNIZE NO ADMIN: HCPCS | Performed by: FAMILY MEDICINE

## 2021-03-26 PROCEDURE — 99214 OFFICE O/P EST MOD 30 MIN: CPT | Performed by: FAMILY MEDICINE

## 2021-03-26 PROCEDURE — G8427 DOCREV CUR MEDS BY ELIG CLIN: HCPCS | Performed by: FAMILY MEDICINE

## 2021-03-26 RX ORDER — IBUPROFEN 800 MG/1
800 TABLET ORAL EVERY 8 HOURS PRN
Qty: 90 TABLET | Refills: 0 | Status: CANCELLED | OUTPATIENT
Start: 2021-03-26

## 2021-03-26 ASSESSMENT — PATIENT HEALTH QUESTIONNAIRE - PHQ9
SUM OF ALL RESPONSES TO PHQ QUESTIONS 1-9: 0
2. FEELING DOWN, DEPRESSED OR HOPELESS: 0
SUM OF ALL RESPONSES TO PHQ QUESTIONS 1-9: 0

## 2021-03-26 NOTE — PROGRESS NOTES
[address] Note    Date: 3/26/2021                                               Subjective/Objective:     Chief Complaint   Patient presents with    Other     suture removal     Discuss Labs       HPI   Pt here for suture removal. Had facial laceration 5 days ago. Pain is improving. Bruising of face is improving. Pt has concerns for bulge around naval for 1 year. Is getting a little bigger. Not painful. Pt had LITTLE 5 days ago in ED. Creatinine fell from 1.7 to 1.4 two days ago. LFTs were 70 and 315 two days ago. Patient also had elevated MCV and his CBC 5 days ago. Patient reports having a poor diet, drinks a sixpack of beer per day along with 1/2 L of liquor. Stop drinking about a week ago. Patient Active Problem List    Diagnosis Date Noted    Arnel's nodes (with arthropathy) 12/09/2020    Arthritis 12/09/2020    Idiopathic gout 12/09/2020    Dupuytren contracture 01/19/2016    Smoking 01/19/2016    Hypertension     Hepatic disease     Hyperlipidemia     Neuropathy, peripheral     Alcohol abuse        Past Medical History:   Diagnosis Date    Alcohol abuse     Gout     Hepatic disease     Hyperlipidemia     Hypertension     Neuropathy, peripheral        Current Outpatient Medications   Medication Sig Dispense Refill    doxycycline hyclate (VIBRA-TABS) 100 MG tablet Take 1 tablet by mouth 2 times daily for 10 days 20 tablet 0    sildenafil (VIAGRA) 50 MG tablet Take 1 tablet by mouth daily as needed for Erectile Dysfunction 30 tablet 1    gabapentin (NEURONTIN) 400 MG capsule Take 2 capsules by mouth 3 times daily for 180 days.  Intended supply: 30 days 180 capsule 5    ibuprofen (ADVIL;MOTRIN) 800 MG tablet TAKE 1 TABLET BY MOUTH EVERY 8 HOURS AS NEEDED FOR PAIN 90 tablet 0    lisinopril (PRINIVIL;ZESTRIL) 20 MG tablet Take 1 tablet by mouth daily 90 tablet 3    indomethacin (INDOCIN) 25 MG capsule Take 1 capsule by mouth 3 times daily for 5 days 15 capsule 0     No current facility-administered medications for this visit. No Known Allergies    Review of Systems   No cough, no fever, no vomiting    Vitals:  /86   Pulse 82   Temp 97.3 °F (36.3 °C)   Ht 6' 3\" (1.905 m)   Wt 214 lb (97.1 kg)   SpO2 99%   BMI 26.75 kg/m²     Physical Exam   General:  Well-appearing, NAD, alert, non-toxic  HEENT:  Normocephalic. +contusions under both eyes. +2cm healed laceration above L eyebrow. 2 sutures intact. +superficial abrasion of bridge of nose and L temple and L nose. No drainage noted. CHEST/LUNGS: No dyspnea  EXTREMETIES: Normal movement of all extremities. No edema. No joint swelling. SKIN:  No rash, no cellulitis, no bruising, no petechiae/purpura/vesicles/pustules/abscess  GI: +3 cm umbilical hernia superiorly, reducible. NEURO:  GCS 15, CN2-12 grossly intact, no focal motor/sensory deficits, normal gait, normal speech      Assessment/Plan     59-year-old male with facial trauma. Bruises and laceration are healing well.  2 sutures removed without problem. Daily wound care advice given. Patient has umbilical hernia. Will refer to general surgery for further evaluation treatment. No sign of incarceration. Patient had recent lab derangement, including acute kidney injury and elevated LFTs and elevated MCV. Will check CMP to confirm resolution of LITTLE. We will also monitor liver enzymes. Patient advised to avoid alcohol. For the elevated MCV, will check vitamin B12 and folate and vitamin D, could be due to nutritional deficiency. Discussed healthy diet. Discussed with patient medication/s dosage, instructions, potential S/E, A/R and Drug Interaction  Tylenol or Motrin as needed for pain or fever  Advise to return here if worse or go to nearest ER  Encourage fluids  Pt discharged in stable condition at 11:25      1. Acute kidney injury (nontraumatic) (HCC)    - COMPREHENSIVE METABOLIC PANEL; Future    2.  Facial laceration, subsequent

## 2021-03-29 DIAGNOSIS — E53.8 FOLIC ACID DEFICIENCY: ICD-10-CM

## 2021-03-29 DIAGNOSIS — E55.9 VITAMIN D DEFICIENCY: Primary | ICD-10-CM

## 2021-03-29 RX ORDER — LANOLIN ALCOHOL/MO/W.PET/CERES
400 CREAM (GRAM) TOPICAL DAILY
Qty: 30 TABLET | Refills: 3 | Status: SHIPPED | OUTPATIENT
Start: 2021-03-29 | End: 2021-07-19

## 2021-03-29 RX ORDER — MELATONIN
1000 DAILY
Qty: 90 TABLET | Refills: 1 | Status: SHIPPED | OUTPATIENT
Start: 2021-03-29 | End: 2021-08-02

## 2021-03-31 ENCOUNTER — OFFICE VISIT (OUTPATIENT)
Dept: SURGERY | Age: 56
End: 2021-03-31
Payer: COMMERCIAL

## 2021-03-31 VITALS
DIASTOLIC BLOOD PRESSURE: 98 MMHG | HEIGHT: 75 IN | BODY MASS INDEX: 26.61 KG/M2 | SYSTOLIC BLOOD PRESSURE: 140 MMHG | TEMPERATURE: 97.3 F | WEIGHT: 214 LBS

## 2021-03-31 DIAGNOSIS — K42.9 UMBILICAL HERNIA WITHOUT OBSTRUCTION AND WITHOUT GANGRENE: Primary | ICD-10-CM

## 2021-03-31 PROCEDURE — 99203 OFFICE O/P NEW LOW 30 MIN: CPT | Performed by: SURGERY

## 2021-03-31 PROCEDURE — G8419 CALC BMI OUT NRM PARAM NOF/U: HCPCS | Performed by: SURGERY

## 2021-03-31 PROCEDURE — 4004F PT TOBACCO SCREEN RCVD TLK: CPT | Performed by: SURGERY

## 2021-03-31 PROCEDURE — G8484 FLU IMMUNIZE NO ADMIN: HCPCS | Performed by: SURGERY

## 2021-03-31 PROCEDURE — 3017F COLORECTAL CA SCREEN DOC REV: CPT | Performed by: SURGERY

## 2021-03-31 PROCEDURE — G8427 DOCREV CUR MEDS BY ELIG CLIN: HCPCS | Performed by: SURGERY

## 2021-03-31 ASSESSMENT — ENCOUNTER SYMPTOMS
EYE DISCHARGE: 0
ABDOMINAL DISTENTION: 0
APNEA: 0
EYE ITCHING: 0
ABDOMINAL PAIN: 1
COLOR CHANGE: 0
BACK PAIN: 0
CHEST TIGHTNESS: 0

## 2021-03-31 NOTE — PATIENT INSTRUCTIONS
1.  We will evaluate abdominal wall as well as liver with CT of abdomen and pelvis. Should the patient have evidence of cirrhosis, may benefit from liver biopsy as well when umbilical hernia repair is performed  2. Warning signs of an incarcerated hernia include inability to reduce the bulge, increased and constant pain, nausea, vomiting, fevers, chills and constipation. This is a surgical emergency and the patient should contact the office or present to an emergency department  3.  Return to office 2 weeks to discuss results and further options

## 2021-03-31 NOTE — PROGRESS NOTES
[facility]  SUBJECTIVE:    Chief Complaint: umbilical hernia    David Watson   1965   54 y.o. male presents with an umbilical hernia present for at least a year. It is always reducible but has been recently tender to reduce. Most pronounced with lifting and straining and relieved with rest.  Admits to heavy alcohol use with at least a sixpack of beer per night, half liter of liquor. He has not drank for the last 10 days after a fall which is what prompted his follow-up with primary care. He does not continue to abstain though. During his work-up for the fall it was found that he had this umbilical hernia and referred to general surgery for evaluation. Has never had abdominal surgery before. Otherwise denies fevers chills chest pain dyspnea cough nausea vomiting jaundice dysuria change in appetite weight or bowel movements. On that work-up he was found to have elevated LFTs but improved after several days. Has not been worked up fully, but no known history of cirrhosis. Review of Systems   Constitutional: Negative for activity change and appetite change. HENT: Negative for congestion and dental problem. Eyes: Negative for discharge and itching. Respiratory: Negative for apnea and chest tightness. Cardiovascular: Negative for chest pain and leg swelling. Gastrointestinal: Positive for abdominal pain. Negative for abdominal distention. Endocrine: Negative for cold intolerance and heat intolerance. Genitourinary: Negative for difficulty urinating and dysuria. Musculoskeletal: Negative for arthralgias and back pain. Skin: Negative for color change and pallor. Allergic/Immunologic: Negative for environmental allergies and food allergies. Neurological: Negative for dizziness and facial asymmetry. Hematological: Negative for adenopathy. Does not bruise/bleed easily.    Psychiatric/Behavioral: Negative for agitation and behavioral problems.        Past Medical History:   Diagnosis Date    Alcohol abuse     Gout     Hepatic disease     Hyperlipidemia     Hypertension     Neuropathy, peripheral      Past Surgical History:   Procedure Laterality Date    MOUTH SURGERY       Social History     Socioeconomic History    Marital status:      Spouse name: Not on file    Number of children: Not on file    Years of education: Not on file    Highest education level: Not on file   Occupational History    Not on file   Social Needs    Financial resource strain: Not on file    Food insecurity     Worry: Not on file     Inability: Not on file    Transportation needs     Medical: Not on file     Non-medical: Not on file   Tobacco Use    Smoking status: Current Every Day Smoker     Packs/day: 2.00     Years: 10.00     Pack years: 20.00     Types: Cigarettes    Smokeless tobacco: Never Used   Substance and Sexual Activity    Alcohol use: Not Currently     Comment: daily - 6 pack per day  and 1L jag per day    Drug use: No    Sexual activity: Not on file   Lifestyle    Physical activity     Days per week: Not on file     Minutes per session: Not on file    Stress: Not on file   Relationships    Social connections     Talks on phone: Not on file     Gets together: Not on file     Attends Jehovah's witness service: Not on file     Active member of club or organization: Not on file     Attends meetings of clubs or organizations: Not on file     Relationship status: Not on file    Intimate partner violence     Fear of current or ex partner: Not on file     Emotionally abused: Not on file     Physically abused: Not on file     Forced sexual activity: Not on file   Other Topics Concern    Not on file   Social History Narrative    Not on file      Family History   Problem Relation Age of Onset    Cancer Father         lung    Diabetes Maternal Grandmother     Diabetes Maternal Grandfather     Diabetes Paternal Grandmother     Diabetes Paternal Grandfather      Current Outpatient Medications   Medication Sig Dispense Refill    folic acid (V-R FOLIC ACID) 302 MCG tablet Take 1 tablet by mouth daily 30 tablet 3    vitamin D3 (CHOLECALCIFEROL) 25 MCG (1000 UT) TABS tablet Take 1 tablet by mouth daily 90 tablet 1    doxycycline hyclate (VIBRA-TABS) 100 MG tablet Take 1 tablet by mouth 2 times daily for 10 days 20 tablet 0    sildenafil (VIAGRA) 50 MG tablet Take 1 tablet by mouth daily as needed for Erectile Dysfunction 30 tablet 1    gabapentin (NEURONTIN) 400 MG capsule Take 2 capsules by mouth 3 times daily for 180 days. Intended supply: 30 days 180 capsule 5    ibuprofen (ADVIL;MOTRIN) 800 MG tablet TAKE 1 TABLET BY MOUTH EVERY 8 HOURS AS NEEDED FOR PAIN 90 tablet 0    lisinopril (PRINIVIL;ZESTRIL) 20 MG tablet Take 1 tablet by mouth daily 90 tablet 3    indomethacin (INDOCIN) 25 MG capsule Take 1 capsule by mouth 3 times daily for 5 days 15 capsule 0     No current facility-administered medications for this visit. No Known Allergies     OBJECTIVE:  BP (!) 140/98   Temp 97.3 °F (36.3 °C) (Infrared)   Ht 6' 3\" (1.905 m)   Wt 214 lb (97.1 kg)   BMI 26.75 kg/m²    Physical Exam  Constitutional:       General: He is not in acute distress. Appearance: He is well-developed. He is not diaphoretic. HENT:      Head: Normocephalic and atraumatic. Eyes:      Conjunctiva/sclera: Conjunctivae normal.      Pupils: Pupils are equal, round, and reactive to light. Neck:      Musculoskeletal: Normal range of motion and neck supple. Vascular: No JVD. Cardiovascular:      Rate and Rhythm: Normal rate and regular rhythm. Heart sounds: Normal heart sounds. Pulmonary:      Effort: Pulmonary effort is normal. No respiratory distress. Breath sounds: Normal breath sounds. No wheezing. Abdominal:      General: Bowel sounds are normal. There is no distension. Palpations: Abdomen is soft. There is no mass.       Tenderness: There is no abdominal tenderness. There is no guarding. Lymphadenopathy:      Cervical: No cervical adenopathy. Skin:     General: Skin is warm and dry. Findings: No erythema or rash. Neurological:      Mental Status: He is alert and oriented to person, place, and time. Psychiatric:         Behavior: Behavior normal.         Thought Content: Thought content normal.         Judgment: Judgment normal.          Labs:  Orders Only on 03/26/2021   Component Date Value Ref Range Status    Sodium 03/26/2021 143  136 - 145 mmol/L Final    Potassium 03/26/2021 3.4* 3.5 - 5.1 mmol/L Final    Chloride 03/26/2021 99  99 - 110 mmol/L Final    CO2 03/26/2021 35* 21 - 32 mmol/L Final    Anion Gap 03/26/2021 9  3 - 16 Final    Glucose 03/26/2021 87  70 - 99 mg/dL Final    BUN 03/26/2021 12  7 - 20 mg/dL Final    CREATININE 03/26/2021 0.9  0.9 - 1.3 mg/dL Final    GFR Non- 03/26/2021 >60  >60 Final    Comment: >60 mL/min/1.73m2 EGFR, calc. for ages 25 and older using the  MDRD formula (not corrected for weight), is valid for stable  renal function.  GFR  03/26/2021 >60  >60 Final    Comment: Chronic Kidney Disease: less than 60 ml/min/1.73 sq.m. Kidney Failure: less than 15 ml/min/1.73 sq.m. Results valid for patients 18 years and older.  Calcium 03/26/2021 8.8  8.3 - 10.6 mg/dL Final    Total Protein 03/26/2021 6.3* 6.4 - 8.2 g/dL Final    Albumin 03/26/2021 4.0  3.4 - 5.0 g/dL Final    Albumin/Globulin Ratio 03/26/2021 1.7  1.1 - 2.2 Final    Total Bilirubin 03/26/2021 0.9  0.0 - 1.0 mg/dL Final    Alkaline Phosphatase 03/26/2021 122  40 - 129 U/L Final    ALT 03/26/2021 42* 10 - 40 U/L Final    AST 03/26/2021 65* 15 - 37 U/L Final    Globulin 03/26/2021 2.3  g/dL Final    Vit D, 25-Hydroxy 03/26/2021 23.4* >=30 ng/mL Final    Comment: <=20 ng/mL. ........... Sridevi Elder Deficient  21-29 ng/mL. ......... Sridevi Elder Insufficient  >=30 ng/mL. ........ Sridevi Elder Sufficient      Vitamin B-12 03/26/2021 412  211 - 911 pg/mL Final    Folate 03/26/2021 3.91* 4.78 - 24.20 ng/mL Final    Comment: Effective 11-15-16 10:00am EST  Please note reference ranges have  changed for Folate. Orders Only on 03/23/2021   Component Date Value Ref Range Status    Sodium 03/23/2021 138  136 - 145 mmol/L Final    Potassium 03/23/2021 2.6* 3.5 - 5.1 mmol/L Final    Repeated    Chloride 03/23/2021 92* 99 - 110 mmol/L Final    CO2 03/23/2021 33* 21 - 32 mmol/L Final    Anion Gap 03/23/2021 13  3 - 16 Final    Glucose 03/23/2021 105* 70 - 99 mg/dL Final    BUN 03/23/2021 22* 7 - 20 mg/dL Final    CREATININE 03/23/2021 1.4* 0.9 - 1.3 mg/dL Final    GFR Non- 03/23/2021 53* >60 Final    Comment: >60 mL/min/1.73m2 EGFR, calc. for ages 25 and older using the  MDRD formula (not corrected for weight), is valid for stable  renal function.  GFR  03/23/2021 >60  >60 Final    Comment: Chronic Kidney Disease: less than 60 ml/min/1.73 sq.m. Kidney Failure: less than 15 ml/min/1.73 sq.m. Results valid for patients 18 years and older.       Calcium 03/23/2021 9.0  8.3 - 10.6 mg/dL Final    Total Protein 03/23/2021 6.8  6.4 - 8.2 g/dL Final    Albumin 03/23/2021 4.3  3.4 - 5.0 g/dL Final    Albumin/Globulin Ratio 03/23/2021 1.7  1.1 - 2.2 Final    Total Bilirubin 03/23/2021 1.0  0.0 - 1.0 mg/dL Final    Alkaline Phosphatase 03/23/2021 136* 40 - 129 U/L Final    ALT 03/23/2021 70* 10 - 40 U/L Final    AST 03/23/2021 315* 15 - 37 U/L Final    Globulin 03/23/2021 2.5  g/dL Final   Admission on 03/21/2021, Discharged on 03/21/2021   Component Date Value Ref Range Status    Ventricular Rate 03/21/2021 139  BPM Final    Atrial Rate 03/21/2021 139  BPM Final    P-R Interval 03/21/2021 114  ms Final    QRS Duration 03/21/2021 86  ms Final    Q-T Interval 03/21/2021 292  ms Final    QTc Calculation (Bazett) 03/21/2021 444  ms Final    P Axis 03/21/2021 34  degrees Final    R Axis 03/21/2021 -68  degrees Final    T Axis 03/21/2021 85  degrees Final    Diagnosis 03/21/2021 Sinus tachycardiaLeft anterior fascicular blockAbnormal ECGConfirmed by NUSRAT RIVERO MD ([id number]) on 3/21/2021 10:29:24 AM   Final    WBC 03/21/2021 9.5  4.0 - 11.0 K/uL Final    RBC 03/21/2021 4.07* 4.20 - 5.90 M/uL Final    Hemoglobin 03/21/2021 15.9  13.5 - 17.5 g/dL Final    Hematocrit 03/21/2021 46.2  40.5 - 52.5 % Final    MCV 03/21/2021 113.5* 80.0 - 100.0 fL Final    MCH 03/21/2021 39.1* 26.0 - 34.0 pg Final    MCHC 03/21/2021 34.5  31.0 - 36.0 g/dL Final    RDW 03/21/2021 18.9* 12.4 - 15.4 % Final    Platelets 41/53/9276 83* 135 - 450 K/uL Final    MPV 03/21/2021 10.0  5.0 - 10.5 fL Final    PLATELET SLIDE REVIEW 03/21/2021 Decreased   Final    SLIDE REVIEW 03/21/2021 see below   Final    Slide review agrees with reported results    Path Consult 03/21/2021 Yes   Final    Sent for Pathology Review    Neutrophils % 03/21/2021 82.5  % Final    Lymphocytes % 03/21/2021 12.6  % Final    Monocytes % 03/21/2021 4.3  % Final    Eosinophils % 03/21/2021 0.2  % Final    Basophils % 03/21/2021 0.4  % Final    Neutrophils Absolute 03/21/2021 7.8* 1.7 - 7.7 K/uL Final    Lymphocytes Absolute 03/21/2021 1.2  1.0 - 5.1 K/uL Final    Monocytes Absolute 03/21/2021 0.4  0.0 - 1.3 K/uL Final    Eosinophils Absolute 03/21/2021 0.0  0.0 - 0.6 K/uL Final    Basophils Absolute 03/21/2021 0.0  0.0 - 0.2 K/uL Final    Anisocytosis 03/21/2021 1+*  Final    Macrocytes 03/21/2021 1+*  Final    Lactic Acid, Sepsis 03/21/2021 see below  0.4 - 1.9 mmol/L Final    Comment: Specimen grossly hemolyzed. To perform testing the specimen will need to  be recollected.  Blood Culture, Routine 03/21/2021 No Growth after 4 days of incubation. Final    Culture, Blood 2 03/21/2021 No Growth after 4 days of incubation.    Final    Rejected Test 03/21/2021 CMPX, BNP, TROP   Final    Reason for Rejection 03/21/2021 see below   Final    Comment: Unable to perform testing; specimen grossly hemolyzed. To perform testing the specimen will need to be recollected. Hemolyz      Sodium 03/21/2021 134* 136 - 145 mmol/L Final    Potassium reflex Magnesium 03/21/2021 3.4* 3.5 - 5.1 mmol/L Final    Chloride 03/21/2021 85* 99 - 110 mmol/L Final    CO2 03/21/2021 36* 21 - 32 mmol/L Final    Anion Gap 03/21/2021 13  3 - 16 Final    Glucose 03/21/2021 128* 70 - 99 mg/dL Final    BUN 03/21/2021 22* 7 - 20 mg/dL Final    CREATININE 03/21/2021 1.7* 0.9 - 1.3 mg/dL Final    GFR Non- 03/21/2021 42* >60 Final    Comment: >60 mL/min/1.73m2 EGFR, calc. for ages 25 and older using the  MDRD formula (not corrected for weight), is valid for stable  renal function.  GFR  03/21/2021 51* >60 Final    Comment: Chronic Kidney Disease: less than 60 ml/min/1.73 sq.m. Kidney Failure: less than 15 ml/min/1.73 sq.m. Results valid for patients 18 years and older.  Calcium 03/21/2021 9.5  8.3 - 10.6 mg/dL Final    Total Protein 03/21/2021 7.8  6.4 - 8.2 g/dL Final    Albumin 03/21/2021 4.8  3.4 - 5.0 g/dL Final    Albumin/Globulin Ratio 03/21/2021 1.6  1.1 - 2.2 Final    Total Bilirubin 03/21/2021 3.2* 0.0 - 1.0 mg/dL Final    Alkaline Phosphatase 03/21/2021 118  40 - 129 U/L Final    ALT 03/21/2021 20  10 - 40 U/L Final    AST 03/21/2021 74* 15 - 37 U/L Final    Globulin 03/21/2021 3.0  g/dL Final    Troponin 03/21/2021 <0.01  <0.01 ng/mL Final    Methodology by Troponin T    Pro-BNP 03/21/2021 325* 0 - 124 pg/mL Final    Comment: Methodology by NT-proBNP    An age-independent cutoff point of 300 pg/ml has a 98%  negative predictive value excluding acute heart failure. Values exceeding the age-related cutoff values (450 pg/mL if  age<50, 900 if 50-75 and 1800 if >75) has 90% sensitivity and  84% specificity for diagnosing acute HF.  In patients with  renal compromise (eGFR<60) values greater than 1200pg/ml have  a diagnostic sensitivity and specificity of 89% and 72% for  acute HF.  Lipase 03/21/2021 14.0  13.0 - 60.0 U/L Final    Amylase 03/21/2021 14* 25 - 115 U/L Final    Ethanol Lvl 03/21/2021 None Detected  mg/dL Final    Comment:    None Detected  Conversion factor:  100 mg/dl = .100 g/dl  For Medical Purposes Only      Magnesium 03/21/2021 0.90* 1.80 - 2.40 mg/dL Final    Path Consult 03/21/2021 Reviewed   Final    Comment: Peripheral blood smear and histogram are reviewed. Macrocytes with Thrombocytopenia. There are no significant schistocytes. Uncertain  whether this represents a consumptive/destructive process or  marrow suppression/failure. Consider splenic sequestration  (hypersplenism), autoimmune disorder, drug effect, infection,  or primary marrow disorder (myelodysplasia or other marrow  failure process). Should no etiology be identified, recommend  hematology consultation. CPT code: 75643. Electronically signed out by  Andrew Enciso MD PhD         Imaging:  Ct Head Wo Contrast    Result Date: 3/21/2021  EXAMINATION: CT OF THE HEAD WITHOUT CONTRAST; CT OF THE FACE WITHOUT CONTRAST  3/21/2021 2:34 am TECHNIQUE: CT of the head was performed without the administration of intravenous contrast. Dose modulation, iterative reconstruction, and/or weight based adjustment of the mA/kV was utilized to reduce the radiation dose to as low as reasonably achievable.; CT of the face was performed without the administration of intravenous contrast. Multiplanar reformatted images are provided for review. Dose modulation, iterative reconstruction, and/or weight based adjustment of the mA/kV was utilized to reduce the radiation dose to as low as reasonably achievable.  COMPARISON: October 2, 2017 HISTORY: ORDERING SYSTEM PROVIDED HISTORY: eval for ICH; head injury; facial trauma TECHNOLOGIST PROVIDED HISTORY: Reason for exam:->eval for ICH Has a \"code stroke\" or \"stroke alert\" been called? ->No Decision Support Exception->Emergency Medical Condition (MA) Reason for Exam: Eval for fx Acuity: Acute Type of Exam: Initial Mechanism of Injury: Eval for fx FINDINGS: BRAIN/VENTRICLES: There is no acute intracranial hemorrhage, mass effect or midline shift. No abnormal extra-axial fluid collection. The gray-white differentiation is maintained without evidence of an acute infarct. There is no evidence of hydrocephalus. Significant beam hardening artifact particularly through the posterior fossa in addition to motion artifact. ORBITS: The visualized portion of the orbits demonstrate no acute abnormality. SINUSES: There appears to be a small mucocele in the right maxillary sinus. Paranasal sinuses and mastoid air cells otherwise clear. SOFT TISSUES/SKULL:  Bilateral supraorbital and left frontal soft tissue swelling. The calvarium is intact. FACIAL BONES:  The maxilla, pterygoid plates and zygomatic arches are intact. The mandible is intact. The mandibular condyles are normally situated. The nasal bones and maxillary nasal processes are intact. ORBITS:  The globes appear intact. The extraocular muscles, optic nerve sheath complexes and lacrimal glands appear unremarkable. No retrobulbar hematoma or mass is seen. The orbital walls and rims are intact. No definite acute intracranial abnormality. The study is somewhat limited by motion artifact and beam hardening artifact which partially obscures the middle and posterior cranial fossa. No facial fracture. Bilateral supraorbital and focal left frontal soft tissue swelling.      Ct Facial Bones Wo Contrast    Result Date: 3/21/2021  EXAMINATION: CT OF THE HEAD WITHOUT CONTRAST; CT OF THE FACE WITHOUT CONTRAST  3/21/2021 2:34 am TECHNIQUE: CT of the head was performed without the administration of intravenous contrast. Dose modulation, iterative reconstruction, and/or weight based adjustment of the mA/kV was utilized to reduce the radiation dose to as low as reasonably achievable.; CT of the face was performed without the administration of intravenous contrast. Multiplanar reformatted images are provided for review. Dose modulation, iterative reconstruction, and/or weight based adjustment of the mA/kV was utilized to reduce the radiation dose to as low as reasonably achievable. COMPARISON: October 2, 2017 HISTORY: ORDERING SYSTEM PROVIDED HISTORY: eval for ICH; head injury; facial trauma TECHNOLOGIST PROVIDED HISTORY: Reason for exam:->eval for ICH Has a \"code stroke\" or \"stroke alert\" been called? ->No Decision Support Exception->Emergency Medical Condition (MA) Reason for Exam: Eval for fx Acuity: Acute Type of Exam: Initial Mechanism of Injury: Eval for fx FINDINGS: BRAIN/VENTRICLES: There is no acute intracranial hemorrhage, mass effect or midline shift. No abnormal extra-axial fluid collection. The gray-white differentiation is maintained without evidence of an acute infarct. There is no evidence of hydrocephalus. Significant beam hardening artifact particularly through the posterior fossa in addition to motion artifact. ORBITS: The visualized portion of the orbits demonstrate no acute abnormality. SINUSES: There appears to be a small mucocele in the right maxillary sinus. Paranasal sinuses and mastoid air cells otherwise clear. SOFT TISSUES/SKULL:  Bilateral supraorbital and left frontal soft tissue swelling. The calvarium is intact. FACIAL BONES:  The maxilla, pterygoid plates and zygomatic arches are intact. The mandible is intact. The mandibular condyles are normally situated. The nasal bones and maxillary nasal processes are intact. ORBITS:  The globes appear intact. The extraocular muscles, optic nerve sheath complexes and lacrimal glands appear unremarkable. No retrobulbar hematoma or mass is seen. The orbital walls and rims are intact. No definite acute intracranial abnormality.   The study is somewhat limited by motion artifact and beam hardening artifact which partially obscures the middle and posterior cranial fossa. No facial fracture. Bilateral supraorbital and focal left frontal soft tissue swelling. Ct Cervical Spine Wo Contrast    Result Date: 3/21/2021  EXAMINATION: CT OF THE CERVICAL SPINE WITHOUT CONTRAST 3/21/2021 2:34 am TECHNIQUE: CT of the cervical spine was performed without the administration of intravenous contrast. Multiplanar reformatted images are provided for review. Dose modulation, iterative reconstruction, and/or weight based adjustment of the mA/kV was utilized to reduce the radiation dose to as low as reasonably achievable. COMPARISON: October 2, 2017 HISTORY: ORDERING SYSTEM PROVIDED HISTORY: Neck pain after injury. TECHNOLOGIST PROVIDED HISTORY: Reason for exam:->Eval for fx Decision Support Exception->Emergency Medical Condition (MA) Reason for Exam: Eval for fx Acuity: Acute Type of Exam: Initial Mechanism of Injury: Eval for fx FINDINGS: CERVICAL SPINE: Occipital Condyles: Intact. C1: Intact. Odontoid Process: Intact. Cervical Vertebral Body Heights: Normal. Facets: Intact. Spinous Processes: Intact. Alignment: Normal. Degenerative changes: Mild scattered degenerative endplate spurring. Calcifications noted of the nuchal ligament. Mild scattered hypertrophic facet changes. Soft tissues: The paraspinal soft tissues show no acute findings. No acute abnormality of the cervical spine. Xr Chest Portable    Result Date: 3/21/2021  EXAMINATION: ONE XRAY VIEW OF THE CHEST 3/21/2021 2:53 am COMPARISON: October 2, 2017 HISTORY: ORDERING SYSTEM PROVIDED HISTORY: Shortness of breath TECHNOLOGIST PROVIDED HISTORY: Reason for exam:->Eval for infiltrate FINDINGS: Cardiomediastinal silhouette within normal limits. Lungs and costophrenic sulci are clear. No pneumothorax or subdiaphragmatic free air. No acute osseous abnormality identified. No radiographic evidence of acute cardiopulmonary disease. ASSESSMENT:  Umbilical hernia  Heavy alcohol use  Possible cirrhosis    PLAN:  1. We will evaluate abdominal wall as well as liver with CT of abdomen and pelvis. Should the patient have evidence of cirrhosis, may benefit from liver biopsy as well when umbilical hernia repair is performed  2. Warning signs of an incarcerated hernia include inability to reduce the bulge, increased and constant pain, nausea, vomiting, fevers, chills and constipation. This is a surgical emergency and the patient should contact the office or present to an emergency department  3. Return to office 2 weeks to discuss results and further options    Trevon Monroy MD, FACS  3/31/2021  2:07 PM

## 2021-04-12 DIAGNOSIS — G60.9 IDIOPATHIC PERIPHERAL NEUROPATHY: ICD-10-CM

## 2021-04-12 RX ORDER — GABAPENTIN 600 MG/1
TABLET ORAL
Qty: 90 TABLET | Refills: 1 | Status: SHIPPED | OUTPATIENT
Start: 2021-04-12 | End: 2022-07-06

## 2021-04-12 RX ORDER — GABAPENTIN 400 MG/1
800 CAPSULE ORAL 3 TIMES DAILY
Qty: 180 CAPSULE | Refills: 5 | Status: SHIPPED | OUTPATIENT
Start: 2021-04-12 | End: 2022-04-22

## 2021-04-12 NOTE — TELEPHONE ENCOUNTER
Medication and Quantity requested: gabapentin (NEURONTIN) 400 MG capsule #180        Last Visit  3/23/21    Pharmacy and phone number updated in EPIC:  Yes,[facility]

## 2021-05-12 DIAGNOSIS — N52.9 ERECTILE DYSFUNCTION, UNSPECIFIED ERECTILE DYSFUNCTION TYPE: ICD-10-CM

## 2021-05-13 RX ORDER — SILDENAFIL 50 MG/1
TABLET, FILM COATED ORAL
Qty: 30 TABLET | Refills: 0 | Status: SHIPPED | OUTPATIENT
Start: 2021-05-13 | End: 2022-07-18

## 2021-05-14 ENCOUNTER — TELEPHONE (OUTPATIENT)
Dept: FAMILY MEDICINE CLINIC | Age: 56
End: 2021-05-14

## 2021-06-07 DIAGNOSIS — G60.9 IDIOPATHIC PERIPHERAL NEUROPATHY: ICD-10-CM

## 2021-06-07 RX ORDER — GABAPENTIN 800 MG/1
TABLET ORAL
Qty: 90 TABLET | Refills: 1 | Status: SHIPPED | OUTPATIENT
Start: 2021-06-07 | End: 2021-08-06

## 2021-07-19 DIAGNOSIS — E53.8 FOLIC ACID DEFICIENCY: ICD-10-CM

## 2021-07-19 RX ORDER — LANOLIN ALCOHOL/MO/W.PET/CERES
CREAM (GRAM) TOPICAL
Qty: 90 TABLET | Refills: 3 | Status: SHIPPED | OUTPATIENT
Start: 2021-07-19 | End: 2022-07-18

## 2021-07-30 DIAGNOSIS — E55.9 VITAMIN D DEFICIENCY: ICD-10-CM

## 2021-08-02 RX ORDER — VITAMIN B COMPLEX
TABLET ORAL
Qty: 30 TABLET | Refills: 5 | Status: SHIPPED | OUTPATIENT
Start: 2021-08-02 | End: 2022-08-03

## 2021-08-06 DIAGNOSIS — G60.9 IDIOPATHIC PERIPHERAL NEUROPATHY: ICD-10-CM

## 2021-08-07 RX ORDER — GABAPENTIN 800 MG/1
TABLET ORAL
Qty: 90 TABLET | Refills: 1 | Status: SHIPPED | OUTPATIENT
Start: 2021-08-07 | End: 2021-11-12

## 2021-11-11 DIAGNOSIS — G60.9 IDIOPATHIC PERIPHERAL NEUROPATHY: ICD-10-CM

## 2021-11-11 NOTE — TELEPHONE ENCOUNTER
Medication:   Requested Prescriptions     Pending Prescriptions Disp Refills    gabapentin (NEURONTIN) 800 MG tablet [Pharmacy Med Name: GABAPENTIN 800 MG TABLET] 90 tablet 1     Sig: TAKE 1 TABLET BY MOUTH THREE TIMES A DAY        Last Filled:  8/7/2021, 90, 1    Patient Phone Number: [phone] (home)     Last appt: 3/26/2021   Next appt: Visit date not found    Last OARRS: No flowsheet data found.

## 2021-11-12 RX ORDER — GABAPENTIN 800 MG/1
TABLET ORAL
Qty: 90 TABLET | Refills: 3 | Status: SHIPPED | OUTPATIENT
Start: 2021-11-12 | End: 2022-02-15

## 2021-12-20 ENCOUNTER — TELEPHONE (OUTPATIENT)
Dept: FAMILY MEDICINE CLINIC | Age: 56
End: 2021-12-20

## 2021-12-21 ENCOUNTER — NURSE ONLY (OUTPATIENT)
Dept: FAMILY MEDICINE CLINIC | Age: 56
End: 2021-12-21
Payer: COMMERCIAL

## 2021-12-21 DIAGNOSIS — K42.9 UMBILICAL HERNIA WITHOUT OBSTRUCTION AND WITHOUT GANGRENE: ICD-10-CM

## 2021-12-21 DIAGNOSIS — Z20.822 CLOSE EXPOSURE TO COVID-19 VIRUS: Primary | ICD-10-CM

## 2021-12-21 LAB
INFLUENZA A ANTIGEN, POC: NORMAL
INFLUENZA B ANTIGEN, POC: NORMAL

## 2021-12-21 PROCEDURE — 87804 INFLUENZA ASSAY W/OPTIC: CPT | Performed by: FAMILY MEDICINE

## 2021-12-22 LAB — SARS-COV-2: DETECTED

## 2022-01-04 ENCOUNTER — TELEPHONE (OUTPATIENT)
Dept: FAMILY MEDICINE CLINIC | Age: 57
End: 2022-01-04

## 2022-01-04 NOTE — TELEPHONE ENCOUNTER
I am not sure who told him to retest however retesting with the PCR which is the one we do can be positive for several weeks. If retesting is done it is recommended to be done with the rapid test although current CDC guidelines do not require a patient to be retested again. See guidelines below. Current CDC guidelines: As of 1/1/2022    If you test positive for Covid  Isolate  Stay home for 5 days. If you have no symptoms or your symptoms are resolving after 5 days you can leave the house. Continue to wear a mask around others for 5 additional days. If you have a fever continue to stay home until your fever resolves. If you were exposed to someone with Covid: and you have been boosted, or completed the primary series of Pfizer or Garza Candice within 6 months,or completed the J&J within the last 2 months    Quarantine  Wear a mask around others for 10 days. Test on day 5 if possible. If you develop symptoms get a test and stay at home. If you were exposed to someone with Covid: and you have completed the primary series of Pfizer or Garza Candice over 6 months ago, or completed the J&J over 2 months ago or are unvaccinated;    Stay home for 5 days and after that continue to wear a mask around others for an additional 5 days  If you cannot quarantine you must wear a mask for 10 days  Test on day 5 if possible. If you develop symptoms get a test and stay home    Warning signs: If you are short of breath or if you measure your oxygen and it goes below 90% you should go to the emergency room.   Likewise if you are worried about anything else about the way you feel, you should go to the emergency room

## 2022-01-04 NOTE — TELEPHONE ENCOUNTER
Patient stated he tested positive for Covid 10 days ago and was told to call back after 10 days to be tested again. Claudean Flair he has requested a call back 3 different times and had to call the office to find out he was positive. Says he has not gotten a return phone call. I informed him I didn't see any phone encounters but I would send a message and have someone reach out to him as soon as possible. He didn't understand why I had to send a message and I explained to him I had to let the office know what was going on so it could be addressed properly. He started yelling and I informed him I would send the message but I was not going to stay on the line to be yelled at. He then started screaming profanity and inappropriate insults. I again informed him the message was sent and I was disconnecting.      Please advise

## 2022-01-05 ENCOUNTER — NURSE ONLY (OUTPATIENT)
Dept: FAMILY MEDICINE CLINIC | Age: 57
End: 2022-01-05

## 2022-01-05 DIAGNOSIS — Z20.822 ENCOUNTER FOR PREOPERATIVE SCREENING LABORATORY TESTING FOR COVID-19 VIRUS: Primary | ICD-10-CM

## 2022-01-05 DIAGNOSIS — Z01.812 ENCOUNTER FOR PREOPERATIVE SCREENING LABORATORY TESTING FOR COVID-19 VIRUS: Primary | ICD-10-CM

## 2022-01-06 ENCOUNTER — TELEPHONE (OUTPATIENT)
Dept: FAMILY MEDICINE CLINIC | Age: 57
End: 2022-01-06

## 2022-01-06 LAB — SARS-COV-2: NOT DETECTED

## 2022-01-07 ENCOUNTER — TELEPHONE (OUTPATIENT)
Dept: FAMILY MEDICINE CLINIC | Age: 57
End: 2022-01-07

## 2022-01-07 NOTE — LETTER
[address]  Phone: [phone]  Fax: [phone]    Jimena Jauregui MD        January 7, 2022     Patient: Vanita John   YOB: 1965   Date of Visit: 1/7/2022       To Whom it May Concern:    My patient Vanita John is cleared to go back to work as he is negative for covid. If you have any questions or concerns, please don't hesitate to call.     Sincerely,         Jimena Jauregui MD

## 2022-01-07 NOTE — TELEPHONE ENCOUNTER
----- Message from Norris Pathak sent at 1/7/2022 12:58 PM EST -----  Subject: Message to Provider    QUESTIONS  Information for Provider? Pt needs a letter stating he tested negative for   covid faxed over to his job as soon as possible. Fax number is [phone].     ---------------------------------------------------------------------------  --------------  [address]  What is the best way for the office to contact you? OK to leave message on   voicemail  Preferred Call Back Phone Number? [phone]  ---------------------------------------------------------------------------  --------------  SCRIPT ANSWERS  Relationship to Patient?  Self

## 2022-01-20 ENCOUNTER — TELEPHONE (OUTPATIENT)
Dept: FAMILY MEDICINE CLINIC | Age: 57
End: 2022-01-20

## 2022-01-20 DIAGNOSIS — H91.93 BILATERAL HEARING LOSS, UNSPECIFIED HEARING LOSS TYPE: Primary | ICD-10-CM

## 2022-01-20 NOTE — TELEPHONE ENCOUNTER
Here is an audiologist to use at [facility]. Referral has been placed in epic for him. [facility] [place] [facility]. [address], [address] [place] [address],[address]  [address]  [phone]

## 2022-01-20 NOTE — TELEPHONE ENCOUNTER
----- Message from Mónica Veronica sent at 1/20/2022  3:13 PM EST -----  Subject: Referral Request    QUESTIONS   Reason for referral request? Hearing test- patient believes he needs a   hearing aid   Has the physician seen you for this condition before? No   Preferred Specialist (if applicable)? Do you already have an appointment scheduled? No  Additional Information for Provider? Please give patient a call.  ---------------------------------------------------------------------------  --------------  CALL BACK INFO  What is the best way for the office to contact you? OK to leave message on   voicemail  Preferred Call Back Phone Number?  [phone]

## 2022-02-15 DIAGNOSIS — G60.9 IDIOPATHIC PERIPHERAL NEUROPATHY: ICD-10-CM

## 2022-02-15 NOTE — TELEPHONE ENCOUNTER
Medication:   Requested Prescriptions     Pending Prescriptions Disp Refills    gabapentin (NEURONTIN) 800 MG tablet [Pharmacy Med Name: GABAPENTIN 800 MG TABLET] 90 tablet 3     Sig: TAKE 1 TABLET BY MOUTH THREE TIMES A DAY        Last Filled:  11/12/21 with 3 refill    Patient Phone Number: [phone] (home) [phone] (work)    Last appt: 3/26/2021   Next appt: Visit date not found    Last OARRS: No flowsheet data found.

## 2022-02-16 RX ORDER — GABAPENTIN 800 MG/1
TABLET ORAL
Qty: 90 TABLET | Refills: 3 | Status: SHIPPED | OUTPATIENT
Start: 2022-02-16 | End: 2022-07-06

## 2022-03-21 ENCOUNTER — APPOINTMENT (OUTPATIENT)
Dept: CT IMAGING | Age: 57
End: 2022-03-21
Payer: COMMERCIAL

## 2022-03-21 ENCOUNTER — HOSPITAL ENCOUNTER (EMERGENCY)
Age: 57
Discharge: HOME | End: 2022-03-21
Attending: EMERGENCY MEDICINE
Payer: COMMERCIAL

## 2022-03-21 VITALS
TEMPERATURE: 97.6 F | SYSTOLIC BLOOD PRESSURE: 132 MMHG | HEIGHT: 75 IN | HEART RATE: 87 BPM | RESPIRATION RATE: 16 BRPM | BODY MASS INDEX: 27.35 KG/M2 | OXYGEN SATURATION: 95 % | DIASTOLIC BLOOD PRESSURE: 94 MMHG | WEIGHT: 220 LBS

## 2022-03-21 DIAGNOSIS — S01.312A COMPLEX LACERATION OF LEFT EAR, INITIAL ENCOUNTER: Primary | ICD-10-CM

## 2022-03-21 DIAGNOSIS — F10.920 ACUTE ALCOHOLIC INTOXICATION WITHOUT COMPLICATION (HCC): ICD-10-CM

## 2022-03-21 DIAGNOSIS — Z23 TETANUS TOXOID VACCINATION ADMINISTERED AT CURRENT VISIT: ICD-10-CM

## 2022-03-21 PROCEDURE — 96372 THER/PROPH/DIAG INJ SC/IM: CPT

## 2022-03-21 PROCEDURE — 90471 IMMUNIZATION ADMIN: CPT | Performed by: EMERGENCY MEDICINE

## 2022-03-21 PROCEDURE — 12014 RPR F/E/E/N/L/M 5.1-7.5 CM: CPT

## 2022-03-21 PROCEDURE — 90471 IMMUNIZATION ADMIN: CPT

## 2022-03-21 PROCEDURE — 2500000003 HC RX 250 WO HCPCS: Performed by: EMERGENCY MEDICINE

## 2022-03-21 PROCEDURE — 70450 CT HEAD/BRAIN W/O DYE: CPT

## 2022-03-21 PROCEDURE — 99284 EMERGENCY DEPT VISIT MOD MDM: CPT

## 2022-03-21 PROCEDURE — 72125 CT NECK SPINE W/O DYE: CPT

## 2022-03-21 PROCEDURE — 90715 TDAP VACCINE 7 YRS/> IM: CPT | Performed by: EMERGENCY MEDICINE

## 2022-03-21 PROCEDURE — 6360000002 HC RX W HCPCS: Performed by: EMERGENCY MEDICINE

## 2022-03-21 RX ORDER — LIDOCAINE HYDROCHLORIDE 10 MG/ML
5 INJECTION, SOLUTION EPIDURAL; INFILTRATION; INTRACAUDAL; PERINEURAL ONCE
Status: COMPLETED | OUTPATIENT
Start: 2022-03-21 | End: 2022-03-21

## 2022-03-21 RX ORDER — AMOXICILLIN AND CLAVULANATE POTASSIUM 875; 125 MG/1; MG/1
1 TABLET, FILM COATED ORAL 2 TIMES DAILY
Qty: 20 TABLET | Refills: 0 | Status: SHIPPED | OUTPATIENT
Start: 2022-03-21 | End: 2022-03-31

## 2022-03-21 RX ADMIN — LIDOCAINE HYDROCHLORIDE 5 ML: 10 INJECTION, SOLUTION EPIDURAL; INFILTRATION; INTRACAUDAL; PERINEURAL at 08:22

## 2022-03-21 RX ADMIN — TETANUS TOXOID, REDUCED DIPHTHERIA TOXOID AND ACELLULAR PERTUSSIS VACCINE, ADSORBED 0.5 ML: 5; 2.5; 8; 8; 2.5 SUSPENSION INTRAMUSCULAR at 07:25

## 2022-03-21 NOTE — ED NOTES
Pt arrived to the ER from home by EMS, upon examination Pt stated that he was going to kick Dr. Renetta arriola for agitating him.        Ora Guo RN  03/21/22 4409

## 2022-03-21 NOTE — ED PROVIDER NOTES
[address] [facility]  Chief Complaint   Patient presents with   [facility] in by [facility] EMS, hx of alcoholism, per family is chronic alcoholic. Drinking till 3 am in the morning, slipped on beer bottle fell and hit his left side of his head. First words were \"I don't need to be here. \"     HISTORY OF PRESENT ILLNESS  Elias Borden is a 64 y.o. male who presents to the ED complaining of drinking alcohol last night, slipping and falling on a bottle and landing on his left ear sustaining a laceration to the left pinna. He arrives by EMS but states that he does not feel he needs to be here although his left ear does appear to need primary repair. This happened a few hours prior to arrival.  Is not sure about his tetanus status. He denies loss of consciousness. He is not anticoagulated. He denies any headache or neck pain or injuries anywhere else, including the chest abdomen pelvis back or extremities. No vomiting since injury. He does seem intoxicated on arrival and admits to being so when I ask him, stating \"I wish I was even more drunk. \"    No other complaints, modifying factors or associated symptoms. Nursing notes reviewed.    Past Medical History:   Diagnosis Date    Alcohol abuse     Gout     Hepatic disease     Hyperlipidemia     Hypertension     Neuropathy, peripheral      Past Surgical History:   Procedure Laterality Date    MOUTH SURGERY       Family History   Problem Relation Age of Onset    Cancer Father         lung    Diabetes Maternal Grandmother     Diabetes Maternal Grandfather     Diabetes Paternal Grandmother     Diabetes Paternal Grandfather      Social History     Socioeconomic History    Marital status:      Spouse name: Not on file    Number of children: Not on file    Years of education: Not on file    Highest education level: Not on file   Occupational History    Not on file   Tobacco Use    Smoking status: Current Every Day Smoker     Packs/day: 2.00     Years: 10.00     Pack years: 20.00     Types: Cigarettes    Smokeless tobacco: Never Used   Vaping Use    Vaping Use: Never used   Substance and Sexual Activity    Alcohol use: Yes     Alcohol/week: 12.0 standard drinks     Types: 12 Cans of beer per week     Comment: daily - 12 per day  and 1L jagger per day    Drug use: No    Sexual activity: Not on file   Other Topics Concern    Not on file   Social History Narrative    Not on file     Social Determinants of Health     Financial Resource Strain:     Difficulty of Paying Living Expenses: Not on file   Food Insecurity:     Worried About Running Out of Food in the Last Year: Not on file    Kiel of Food in the Last Year: Not on file   Transportation Needs:     Lack of Transportation (Medical): Not on file    Lack of Transportation (Non-Medical):  Not on file   Physical Activity:     Days of Exercise per Week: Not on file    Minutes of Exercise per Session: Not on file   Stress:     Feeling of Stress : Not on file   Social Connections:     Frequency of Communication with Friends and Family: Not on file    Frequency of Social Gatherings with Friends and Family: Not on file    Attends Baptism Services: Not on file    Active Member of [address] [place] [address] [employer] or Organizations: Not on file    Attends Club or Organization Meetings: Not on file    Marital Status: Not on file   Intimate Partner Violence:     Fear of Current or Ex-Partner: Not on file    Emotionally Abused: Not on file    Physically Abused: Not on file    Sexually Abused: Not on file   Housing Stability:     Unable to Pay for Housing in the Last Year: Not on file    Number of [place] in the Last Year: Not on file    Unstable Housing in the Last Year: Not on file     Current Facility-Administered Medications   Medication Dose Route Frequency Provider Last Rate Last Admin    lidocaine PF 1 % injection 5 mL  5 mL IntraDERmal Evin Rhodes James Erazo MD         Current Outpatient Medications   Medication Sig Dispense Refill    amoxicillin-clavulanate (AUGMENTIN) 875-125 MG per tablet Take 1 tablet by mouth 2 times daily for 10 days 20 tablet 0    gabapentin (NEURONTIN) 800 MG tablet TAKE 1 TABLET BY MOUTH THREE TIMES A DAY 90 tablet 3    Vitamin D (CHOLECALCIFEROL) 25 MCG (1000 UT) TABS tablet TAKE 1 TABLET BY MOUTH EVERY DAY 30 tablet 5    folic acid (FOLVITE) 884 MCG tablet TAKE 1 TABLET BY MOUTH EVERY DAY 90 tablet 3    sildenafil (VIAGRA) 50 MG tablet TAKE ONE TABLET BY MOUTH DAILY AS NEEDED FOR ERECTILE DYSFUNCTION 30 tablet 0    gabapentin (NEURONTIN) 600 MG tablet TAKE 1 TABLET BY MOUTH THREE TIMES A DAY 90 tablet 1    gabapentin (NEURONTIN) 400 MG capsule Take 2 capsules by mouth 3 times daily for 180 days. Intended supply: 30 days 180 capsule 5    ibuprofen (ADVIL;MOTRIN) 800 MG tablet TAKE 1 TABLET BY MOUTH EVERY 8 HOURS AS NEEDED FOR PAIN 90 tablet 0    lisinopril (PRINIVIL;ZESTRIL) 20 MG tablet Take 1 tablet by mouth daily 90 tablet 3    indomethacin (INDOCIN) 25 MG capsule Take 1 capsule by mouth 3 times daily for 5 days 15 capsule 0     No Known Allergies    REVIEW OF SYSTEMS  6 systems reviewed, pertinent positives per HPI otherwise noted to be negative    PHYSICAL EXAM   BP (!) 132/94   Pulse 87   Temp 97.6 °F (36.4 °C) (Oral)   Resp 16   Ht 6' 3\" (1.905 m)   Wt 220 lb (99.8 kg)   SpO2 95%   BMI 27.50 kg/m²    GENERAL APPEARANCE: Awake and alert. Cooperative. No acute distress. HEAD: Normocephalic. Atraumatic except L ear as below. No other craniofacial bony tenderness. EYES: PERRL. EOM's grossly intact. ENT: Mucous membranes are moist. R ear normal.  Nose and oropharynx noted to be normal, no jaw ttp or malocclusion. L ear with normal TM and EAC. Lacerations to L anterior (0.5cm and chevron 2cm), posterior (chevron 1.5cm), and superior (2cm linear) pinna as below. NECK: Supple. Normal ROM.  No Cspine ttp. CHEST: Equal symmetric chest rise. RRR  LUNGS: Breathing is unlabored. Speaking comfortably in full sentences. CTAB  ABDOMEN: Nondistended, nontender  EXTREMITIES: MAEE. No acute deformities. NO ttp in extremities. SKIN: Warm and dry. No acute rashes. NEUROLOGICAL: Alert and oriented. Strength is 5/5 in all extremities and sensation is intact. RADIOLOGY    CT HEAD WO CONTRAST    Result Date: 3/21/2022  EXAMINATION: CT OF THE HEAD WITHOUT CONTRAST; CT OF THE CERVICAL SPINE WITHOUT CONTRAST 3/21/2022 7:41 am TECHNIQUE: CT of the head was performed without the administration of intravenous contrast. Dose modulation, iterative reconstruction, and/or weight based adjustment of the mA/kV was utilized to reduce the radiation dose to as low as reasonably achievable.; CT of the cervical spine was performed without the administration of intravenous contrast. Multiplanar reformatted images are provided for review. Dose modulation, iterative reconstruction, and/or weight based adjustment of the mA/kV was utilized to reduce the radiation dose to as low as reasonably achievable. COMPARISON: None. HISTORY: ORDERING SYSTEM PROVIDED HISTORY: +etoh, head inj wtih L ear lac TECHNOLOGIST PROVIDED HISTORY: Reason for exam:->+etoh, head inj wtih L ear lac Has a \"code stroke\" or \"stroke alert\" been called? ->No Decision Support Exception - unselect if not a suspected or confirmed emergency medical condition->Emergency Medical Condition (MA) Reason for Exam: +etoh, head inj wtih L ear lac Additional signs and symptoms: best possible FINDINGS: CT head: BRAIN/VENTRICLES: Mild cerebral atrophy. No midline shift. Basal cisterns are normally outlined. No acute intra or extra-axial hemorrhage. ORBITS: The visualized portion of the orbits demonstrate no acute abnormality. SINUSES: Polyp or mucous retention cyst right maxillary sinus unchanged. Mastoid air cells demonstrate no acute abnormality.  SOFT TISSUES/SKULL:  No acute abnormality of the visualized skull or soft tissues. CT CERVICAL SPINE: BONES/ALIGNMENT: There is no evidence of an acute cervical spine fracture. There is normal alignment of the cervical spine. DEGENERATIVE CHANGES: Large anterior bridging osteophytes from C4 through C7. Unchanged. SOFT TISSUES: There is no prevertebral soft tissue swelling. No acute intracranial abnormality. No acute fracture or subluxation of the cervical spine. Large anterior bridging osteophytes from C4 through C7.     CT CERVICAL SPINE WO CONTRAST    Result Date: 3/21/2022  EXAMINATION: CT OF THE HEAD WITHOUT CONTRAST; CT OF THE CERVICAL SPINE WITHOUT CONTRAST 3/21/2022 7:41 am TECHNIQUE: CT of the head was performed without the administration of intravenous contrast. Dose modulation, iterative reconstruction, and/or weight based adjustment of the mA/kV was utilized to reduce the radiation dose to as low as reasonably achievable.; CT of the cervical spine was performed without the administration of intravenous contrast. Multiplanar reformatted images are provided for review. Dose modulation, iterative reconstruction, and/or weight based adjustment of the mA/kV was utilized to reduce the radiation dose to as low as reasonably achievable. COMPARISON: None. HISTORY: ORDERING SYSTEM PROVIDED HISTORY: +etoh, head inj wtih L ear lac TECHNOLOGIST PROVIDED HISTORY: Reason for exam:->+etoh, head inj wtih L ear lac Has a \"code stroke\" or \"stroke alert\" been called? ->No Decision Support Exception - unselect if not a suspected or confirmed emergency medical condition->Emergency Medical Condition (MA) Reason for Exam: +etoh, head inj wtih L ear lac Additional signs and symptoms: best possible FINDINGS: CT head: BRAIN/VENTRICLES: Mild cerebral atrophy. No midline shift. Basal cisterns are normally outlined. No acute intra or extra-axial hemorrhage. ORBITS: The visualized portion of the orbits demonstrate no acute abnormality.  SINUSES: Polyp or mucous retention cyst right maxillary sinus unchanged. Mastoid air cells demonstrate no acute abnormality. SOFT TISSUES/SKULL:  No acute abnormality of the visualized skull or soft tissues. CT CERVICAL SPINE: BONES/ALIGNMENT: There is no evidence of an acute cervical spine fracture. There is normal alignment of the cervical spine. DEGENERATIVE CHANGES: Large anterior bridging osteophytes from C4 through C7. Unchanged. SOFT TISSUES: There is no prevertebral soft tissue swelling. No acute intracranial abnormality. No acute fracture or subluxation of the cervical spine. Large anterior bridging osteophytes from C4 through C7. ED COURSE/MDM  Differential diagnosis considerations included: intracranial injury, cervical spine injury, chest/abdominal organ injury, extremity injury, abrasion/laceration, contusion, fracture, sprain/strain, dislocation    The patient's ED course was notable for several left ear lacerations from a fall last night while intoxicated, <8 hours prior to arrival.  Updating tetanus today. No other injuries noted. Due to +etoh though ordered HCT and CT Cspine which showed no acute abnormalities. The cuts were cleaned and closed per the note below. Covering with augmentin empirically and referring to ENT for f/u. Stressed importance of f/u. His mother was at bedside and I instructed her to also help coordinate his f/u since he is intoxicated currently. Discussed high risk of infection and also high risk of auricular hematoma as reasons for urgent ENT follow-up as an outpatient. Strict return precautions also discussed. Laceration Repair Procedure Note    Indication: Laceration(s)    Consent: The patient was counseled regarding the procedure, it's indications, risks, potential complications and alternatives and any questions were answered. Consent was obtained.     Location: L pinna    Shape: Lacerations to L anterior (0.5cm and chevron 2cm), posterior (chevron 1.5cm), and superior (2cm linear) pinna. Procedure: The patient was placed in the appropriate position and anesthesia around the lacerations were obtained by infiltration using 1% Lidocaine without epinephrine. The area was then cleaned with hibiclens and draped in a sterile fashion. The wound was then irrigated with normal saline copiously. The wound was fully explored in a bloodless field and no foreign bodies were found. Location: L anterior pinna. Shape: linear. Size: 0.5cm. The laceration was closed with 5-0 Ethilon using interrupted sutures. Suture count: 1. Location: L anterior pinna. Shape: chevron. Size: 2cm. The laceration was closed with 5-0 Ethilon using interrupted sutures. Suture count: 3. Location: L superior pinna. Shape: linear. Size: 2cm. The laceration was closed with 5-0 Ethilon using interrupted sutures. Suture count: 3. Location: L posterior pinna. Shape: chevron. Size: 1.5cm. The laceration was closed with 5-0 Ethilon using interrupted sutures. Suture count: 1. The wound area was then dressed with a bandage    Total repaired wound length: 6 cm. For reference, laceration length cut-offs for billing purposes are  by lengths of 0-2.5cm, 2.6-5cm, 5.1-7.5cm, and 7.6-12.5cm. Wound classification:  intermediate due to single layer closure requiring copious irrigation    Suture/staple count: total suture count 8    The patient tolerated the procedure well. The patients tetanus status was updated with a tetanus booster. Complications: None          Patient was given scripts for the following medications. I counseled patient how to take these medications. New Prescriptions    AMOXICILLIN-CLAVULANATE (AUGMENTIN) 875-125 MG PER TABLET    Take 1 tablet by mouth 2 times daily for 10 days         CLINICAL IMPRESSION  1. Complex laceration of left ear, initial encounter    2. Tetanus toxoid vaccination administered at current visit    3.  Acute alcoholic intoxication without complication (HCC)        Blood pressure (!) 132/94, pulse 87, temperature 97.6 °F (36.4 °C), temperature source Oral, resp. rate 16, height 6' 3\" (1.905 m), weight 220 lb (99.8 kg), SpO2 95 %. DISPOSITION    I have discussed the findings of today's workup with the patient and addressed the patient's questions and concerns. Important warning signs as well as new or worsening symptoms which would necessitate immediate return to the ED were discussed. The plan is to discharge from the ED at this time, and the patient is in stable condition. The patient acknowledged understanding is agreeable with this plan. Follow-up with:  Merian Mortimer, [address] [place] [phone]    Schedule an appointment as soon as possible for a visit in 1 day  For wound re-check    [facility] Emergency Department  [address] [facility]  [address] [facility] [address] [facility]  Go to   If symptoms worsen      This chart was created using Dragon dictation software. Efforts were made by me to ensure accuracy, however some errors may be present due to limitations of this technology.         Carina Lind MD  03/21/22 7814

## 2022-03-21 NOTE — ED NOTES
NIURKA Salvador to bedside for sutures on pt L ear.      [address]) [facility]  03/21/22 1416

## 2022-03-21 NOTE — ED NOTES
This RN to bedisde to apply head wrap with gauze to pt L ear. Discharge instructions and prescriptions reviewed with pt and pts mother. Wound care explained to pt and pts mother (cleaning and stitches care). Follow up care importance explained and strongly emphasized per NIURKA Salvador's orders. Pt and pts mother allowed opportunity to ask questions and verbalized understanding.         Jeannine Herrera RN  03/21/22 7780

## 2022-03-22 ENCOUNTER — OFFICE VISIT (OUTPATIENT)
Dept: ENT CLINIC | Age: 57
End: 2022-03-22
Payer: COMMERCIAL

## 2022-03-22 VITALS
SYSTOLIC BLOOD PRESSURE: 169 MMHG | TEMPERATURE: 97.3 F | DIASTOLIC BLOOD PRESSURE: 105 MMHG | HEART RATE: 103 BPM | OXYGEN SATURATION: 94 %

## 2022-03-22 DIAGNOSIS — S01.312A LACERATION OF AURICLE OF LEFT EAR, INITIAL ENCOUNTER: Primary | ICD-10-CM

## 2022-03-22 DIAGNOSIS — H90.12 CONDUCTIVE HEARING LOSS OF LEFT EAR WITH UNRESTRICTED HEARING OF RIGHT EAR: ICD-10-CM

## 2022-03-22 DIAGNOSIS — H91.93 BILATERAL HEARING LOSS, UNSPECIFIED HEARING LOSS TYPE: Chronic | ICD-10-CM

## 2022-03-22 DIAGNOSIS — H61.22 IMPACTED CERUMEN OF LEFT EAR: ICD-10-CM

## 2022-03-22 PROCEDURE — 4004F PT TOBACCO SCREEN RCVD TLK: CPT | Performed by: OTOLARYNGOLOGY

## 2022-03-22 PROCEDURE — 3017F COLORECTAL CA SCREEN DOC REV: CPT | Performed by: OTOLARYNGOLOGY

## 2022-03-22 PROCEDURE — 99203 OFFICE O/P NEW LOW 30 MIN: CPT | Performed by: OTOLARYNGOLOGY

## 2022-03-22 PROCEDURE — 69210 REMOVE IMPACTED EAR WAX UNI: CPT | Performed by: OTOLARYNGOLOGY

## 2022-03-22 PROCEDURE — G8484 FLU IMMUNIZE NO ADMIN: HCPCS | Performed by: OTOLARYNGOLOGY

## 2022-03-22 PROCEDURE — G8427 DOCREV CUR MEDS BY ELIG CLIN: HCPCS | Performed by: OTOLARYNGOLOGY

## 2022-03-22 PROCEDURE — G8419 CALC BMI OUT NRM PARAM NOF/U: HCPCS | Performed by: OTOLARYNGOLOGY

## 2022-03-22 ASSESSMENT — ENCOUNTER SYMPTOMS
SINUS PAIN: 0
SORE THROAT: 0
RHINORRHEA: 0

## 2022-03-22 NOTE — PROGRESS NOTES
[facility]       NEW PATIENT VISIT      PCP:  Анна Fowler MD      REFERRED BY:   ER       CHIEF COMPLAINT  Chief Complaint   Patient presents with    Follow-up     fall on the left side ear laceration, patient received stitches        HISTORY OF PRESENT ILLNESS       Mariposa Story is a 64 y.o. male here for evaluation and treatment of laceration of the left ear, sustained when fell onto glass, \"I don't know. I just remember waking up in a pile of blood. \"  He fell onto a beer bottle. He was intoxicated. Here with mother, Tara Pruitt. Went to [facility] ER where it was sutured. He stated it \"feels like there is blood in my ear canal and it is itching and stinging and it drives me nuts. \"     Hearing is decreased. H/o hearing loss at least two years. 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 REVIEW OF SYSTEMS   Review of Systems   Constitutional: Negative for chills and fever. HENT: Positive for hearing loss. Negative for congestion, ear discharge, ear pain, nosebleeds, rhinorrhea, sinus pain, sore throat and tinnitus.           PAST MEDICAL HISTORY    Past Medical History:   Diagnosis Date    Alcohol abuse     Gout     Hepatic disease     Hyperlipidemia     Hypertension     Neuropathy, peripheral        Past Surgical History:   Procedure Laterality Date    MOUTH SURGERY           EXAMINATION    Vitals:    03/22/22 1423 03/22/22 1504   BP: (!) 157/114 (!) 169/105   Site: Right Upper Arm Right Upper Arm   Position: Sitting Sitting   Cuff Size: Medium Adult Medium Adult   Pulse: 103    Temp: 97.3 °F (36.3 °C)    TempSrc: Temporal    SpO2: 94%          ENT Physical Exam  Consititutional  Appearance: patient appears well-developed, well-nourished and well-groomed,  Communication/Voice: communication appropriate for developmental age; vocal quality normal;  Head and Face  Appearance: head appears normal, face appears normal and face appears atraumatic;  Palpation: facial palpation normal;  Salivary: glands normal;  Ear  Hearing: intact; Auricles: right auricle normal; left auricle not inflamed with tenderness and laceration (multiple anterior and posterior surfaces, sutured with blue nylon sutures. No violation of the helix margin.); no preauricular pit, hematoma, skin lesions, abscess or deformity  External Mastoids: right external mastoid normal; left external mastoid normal;  Ear Canals: right ear canal normal; left ear canal normal; Ear Canal comments: blood clot in ear canal.  Tympanic Membranes: right tympanic membrane normal; left tympanic membrane normal;  Nose  External Nose: nares patent bilaterally; external nose normal;  Internal Nose: nasal mucosa normal; septum normal; bilateral inferior turbinates normal;  Oral Cavity/Oropharynx  Lips: normal;  Teeth: normal;  Gums: gingiva normal;  Tongue: normal;  Oral mucosa: normal;  Hard palate: normal;  Soft palate: normal;  Tonsils: normal;  Base of Tongue: normal;  Posterior pharyngeal wall: normal;  Neck  Neck: neck normal; neck palpation normal;  Thyroid: thyroid normal;  Lymphatic  Palpation: no cervical adenopathy noted; no preauricular adenopathy palpable;       Bruise on left lower lateral neck about 2 cm diameter. PROCEDURE - REMOVAL OF CERUMEN IMPACTION (49732):  Obstructing cerumen impaction Blood clot nd debris removed from the leftoccluding the left EAC  and obscuring visualization of the left tympanic membrane, was removed under otomicroscopic visualization, with instrumentation, using a alligator forceps  Under otomicroscopic examination, there was scattered clotted blood in the EAC and otherwise the left EAC and TM appeared to be normal, including normal pneumatic mobility.   Sammy reported improved hearing, back to usual level, after cerumen removal.    HEARING ASSESSMENT (after ear cleaning):  Finger rub:  Able to hear finger rub bilaterally. Tuning fork tests, [address] tuning fork:  Ash test:  heard in both ears   Rinne test: right ear air greater than bone and left ear  air greater than bone             IMPRESSION / DIAGNOSES / Jess Rubi was seen today for follow-up. Diagnoses and all orders for this visit:    Laceration of auricle of left ear, initial encounter    Impacted cerumen of left ear    Conductive hearing loss of left ear with unrestricted hearing of right ear    Bilateral hearing loss, unspecified hearing loss type         RECOMMENDATIONS/PLAN      1. Audiogram, after ear is healed. 2. Acetaminophen (eg. Tylenol) or Ibuprofen (eg. Advil) (over the counter medications) as needed for fever or pain. 3. Return in about 6 days (around 3/28/2022) for recheck/follow-up, remove sutures, and sooner if condition worsens. Patient Instructions   1. Apply polysporin ointment to lacerations twice daily. 2. Keep lacerations dry. 3. You may use acetaminophen (eg. Tylenol) or Ibuprofen (eg. Advil) (over the counter medications) as needed for fever or pain.

## 2022-03-22 NOTE — PATIENT INSTRUCTIONS
1. Apply polysporin ointment to lacerations twice daily. 2. Keep lacerations dry. 3. You may use acetaminophen (eg. Tylenol) or Ibuprofen (eg. Advil) (over the counter medications) as needed for fever or pain.

## 2022-03-28 ENCOUNTER — OFFICE VISIT (OUTPATIENT)
Dept: ENT CLINIC | Age: 57
End: 2022-03-28
Payer: COMMERCIAL

## 2022-03-28 VITALS
SYSTOLIC BLOOD PRESSURE: 166 MMHG | TEMPERATURE: 96.9 F | OXYGEN SATURATION: 95 % | HEART RATE: 96 BPM | DIASTOLIC BLOOD PRESSURE: 102 MMHG

## 2022-03-28 DIAGNOSIS — R43.0 ANOSMIA: ICD-10-CM

## 2022-03-28 DIAGNOSIS — J34.2 NASAL SEPTAL DEVIATION: Primary | ICD-10-CM

## 2022-03-28 DIAGNOSIS — S01.312A LACERATION OF AURICLE OF LEFT EAR, INITIAL ENCOUNTER: ICD-10-CM

## 2022-03-28 DIAGNOSIS — H91.93 BILATERAL HEARING LOSS, UNSPECIFIED HEARING LOSS TYPE: ICD-10-CM

## 2022-03-28 DIAGNOSIS — J34.89 NASAL OBSTRUCTION: ICD-10-CM

## 2022-03-28 DIAGNOSIS — J34.3 HYPERTROPHY OF BOTH INFERIOR NASAL TURBINATES: ICD-10-CM

## 2022-03-28 PROCEDURE — G8419 CALC BMI OUT NRM PARAM NOF/U: HCPCS | Performed by: OTOLARYNGOLOGY

## 2022-03-28 PROCEDURE — 4004F PT TOBACCO SCREEN RCVD TLK: CPT | Performed by: OTOLARYNGOLOGY

## 2022-03-28 PROCEDURE — 99213 OFFICE O/P EST LOW 20 MIN: CPT | Performed by: OTOLARYNGOLOGY

## 2022-03-28 PROCEDURE — 3017F COLORECTAL CA SCREEN DOC REV: CPT | Performed by: OTOLARYNGOLOGY

## 2022-03-28 PROCEDURE — G8427 DOCREV CUR MEDS BY ELIG CLIN: HCPCS | Performed by: OTOLARYNGOLOGY

## 2022-03-28 PROCEDURE — G8484 FLU IMMUNIZE NO ADMIN: HCPCS | Performed by: OTOLARYNGOLOGY

## 2022-03-28 RX ORDER — FLUTICASONE PROPIONATE 50 MCG
SPRAY, SUSPENSION (ML) NASAL
Qty: 16 G | Refills: 2 | Status: SHIPPED | OUTPATIENT
Start: 2022-03-28 | End: 2022-07-18

## 2022-03-28 RX ORDER — CETIRIZINE HYDROCHLORIDE 10 MG/1
10 TABLET ORAL DAILY
Qty: 30 TABLET | Refills: 1 | Status: SHIPPED | OUTPATIENT
Start: 2022-03-28 | End: 2022-05-27

## 2022-03-28 ASSESSMENT — ENCOUNTER SYMPTOMS
RHINORRHEA: 0
SINUS PAIN: 0
SORE THROAT: 0

## 2022-03-28 NOTE — PROGRESS NOTES
[facility]       PCP:  Everton Norton MD      [address] [facility]  Chief Complaint   Patient presents with    Nose Problem     anosmia and dyspnea    Follow-up     removal of sutures from repaired left pinna laceration       HISTORY OF PRESENT ILLNESS       Jerson Martines is a 64 y.o. male here for evaluation and treatment of anosmia for many years and nasal dyspnea, as well as removal of sutures in the pinna laceration. REVIEW OF SYSTEMS   Review of Systems   Constitutional: Negative for chills and fever. HENT: Positive for congestion (when lying down in bed). Negative for drooling, ear discharge, ear pain, rhinorrhea, sinus pain and sore throat. PAST MEDICAL HISTORY    Past Medical History:   Diagnosis Date    Alcohol abuse     Gout     Hepatic disease     Hyperlipidemia     Hypertension     Neuropathy, peripheral        Past Surgical History:   Procedure Laterality Date    MOUTH SURGERY           EXAMINATION    Vitals:    03/28/22 1531   BP: (!) 166/102   Site: Right Upper Arm   Position: Sitting   Cuff Size: Medium Adult   Pulse: 96   Temp: 96.9 °F (36.1 °C)   TempSrc: Temporal   SpO2: 95%       Gen WDWN, NAD  Ears: 10 (5 front and 5 backside) sutures removed with no difficulty. Laceration healing well. No evid infection or dehiscence. Nose: NSD severe to left obstructive. ITH right. OC/OP:  wnl   Neck:  wnl   Sinuses:  wnl             IMPRESSION / DIAGNOSES / Ward Martinez was seen today for nose problem and follow-up. Diagnoses and all orders for this visit:    Nasal septal deviation  -     fluticasone (FLONASE) 50 MCG/ACT nasal spray; 2 sprays in each nostril daily. -     cetirizine (ZYRTEC) 10 MG tablet; Take 1 tablet by mouth daily    Hypertrophy of both inferior nasal turbinates  -     fluticasone (FLONASE) 50 MCG/ACT nasal spray; 2 sprays in each nostril daily.   -     cetirizine

## 2022-04-22 DIAGNOSIS — G60.9 IDIOPATHIC PERIPHERAL NEUROPATHY: ICD-10-CM

## 2022-04-22 RX ORDER — GABAPENTIN 400 MG/1
800 CAPSULE ORAL 3 TIMES DAILY
Qty: 180 CAPSULE | Refills: 0 | Status: SHIPPED | OUTPATIENT
Start: 2022-04-22 | End: 2022-05-25

## 2022-04-24 DIAGNOSIS — R43.0 ANOSMIA: ICD-10-CM

## 2022-04-24 DIAGNOSIS — J34.89 NASAL OBSTRUCTION: ICD-10-CM

## 2022-04-24 DIAGNOSIS — J34.3 HYPERTROPHY OF BOTH INFERIOR NASAL TURBINATES: ICD-10-CM

## 2022-04-24 DIAGNOSIS — J34.2 NASAL SEPTAL DEVIATION: ICD-10-CM

## 2022-04-25 NOTE — TELEPHONE ENCOUNTER
Requested Prescriptions     Pending Prescriptions Disp Refills    fluticasone (FLONASE) 50 MCG/ACT nasal spray [Pharmacy Med Name: FLUTICASONE PROP 50 MCG SPRAY]  2     Sig: SPRAY 2 SPRAYS INTO EACH NOSTRIL EVERY DAY    cetirizine (ZYRTEC) 10 MG tablet [Pharmacy Med Name: CETIRIZINE HCL 10 MG TABLET] 30 tablet 1     Sig: TAKE 1 TABLET BY MOUTH EVERY DAY     Last OV - 3/28/22  Next OV - 4/28/22  Last labs - 3/26/21

## 2022-04-26 RX ORDER — FLUTICASONE PROPIONATE 50 MCG
SPRAY, SUSPENSION (ML) NASAL
Refills: 2 | OUTPATIENT
Start: 2022-04-26

## 2022-04-26 RX ORDER — CETIRIZINE HYDROCHLORIDE 10 MG/1
TABLET ORAL
Qty: 30 TABLET | Refills: 1 | OUTPATIENT
Start: 2022-04-26

## 2022-04-26 NOTE — TELEPHONE ENCOUNTER
If you look at the previous medications, there is no reason for a refill to be requested at this time. He has office visit scheduled for 4/28/22.

## 2022-05-25 DIAGNOSIS — G60.9 IDIOPATHIC PERIPHERAL NEUROPATHY: ICD-10-CM

## 2022-05-25 RX ORDER — GABAPENTIN 400 MG/1
CAPSULE ORAL
Qty: 180 CAPSULE | Refills: 0 | Status: SHIPPED | OUTPATIENT
Start: 2022-05-25 | End: 2022-07-06 | Stop reason: SDUPTHER

## 2022-05-26 DIAGNOSIS — J34.89 NASAL OBSTRUCTION: ICD-10-CM

## 2022-05-26 DIAGNOSIS — R43.0 ANOSMIA: ICD-10-CM

## 2022-05-26 DIAGNOSIS — J34.3 HYPERTROPHY OF BOTH INFERIOR NASAL TURBINATES: ICD-10-CM

## 2022-05-26 DIAGNOSIS — J34.2 NASAL SEPTAL DEVIATION: ICD-10-CM

## 2022-05-26 NOTE — TELEPHONE ENCOUNTER
Refill Request:     Last Office Visit:  3/28/2022   Last Fill date 3/28/22     Next Scheduled Visit : Visit date not found     Medication Requested: Zyrtec     Pharmacy:   [facility]

## 2022-05-27 RX ORDER — CETIRIZINE HYDROCHLORIDE 10 MG/1
TABLET ORAL
Qty: 30 TABLET | Refills: 3 | Status: SHIPPED | OUTPATIENT
Start: 2022-05-27 | End: 2022-07-18

## 2022-07-05 DIAGNOSIS — G60.9 IDIOPATHIC PERIPHERAL NEUROPATHY: ICD-10-CM

## 2022-07-05 RX ORDER — GABAPENTIN 400 MG/1
CAPSULE ORAL
Qty: 180 CAPSULE | Refills: 0 | OUTPATIENT
Start: 2022-07-05 | End: 2022-08-05

## 2022-07-05 NOTE — TELEPHONE ENCOUNTER
Clarify dose with the patient as to what he is taking. I see prescriptions for both 800 mg and 400 mg. Send back in we can refill the appropriate dose.

## 2022-07-06 RX ORDER — GABAPENTIN 800 MG/1
TABLET ORAL
Qty: 90 TABLET | Refills: 3 | OUTPATIENT
Start: 2022-07-06

## 2022-07-06 RX ORDER — GABAPENTIN 400 MG/1
CAPSULE ORAL
Qty: 180 CAPSULE | Refills: 0 | Status: SHIPPED | OUTPATIENT
Start: 2022-07-06 | End: 2022-07-18 | Stop reason: SDUPTHER

## 2022-07-06 NOTE — TELEPHONE ENCOUNTER
Medication:   Requested Prescriptions     Pending Prescriptions Disp Refills    gabapentin (NEURONTIN) 800 MG tablet [Pharmacy Med Name: GABAPENTIN 800 MG TABLET] 90 tablet 3     Sig: TAKE 1 TABLET BY MOUTH THREE TIMES A DAY        Last Filled:  6/25/2022    Patient Phone Number: [phone] (home) [phone] (work)    Last appt: 3/26/2021   Next appt: 7/5/2022    Last OARRS: No flowsheet data found.

## 2022-07-12 DIAGNOSIS — G60.9 IDIOPATHIC PERIPHERAL NEUROPATHY: ICD-10-CM

## 2022-07-12 RX ORDER — GABAPENTIN 800 MG/1
800 TABLET ORAL DAILY
Qty: 90 TABLET | Refills: 3 | OUTPATIENT
Start: 2022-07-12

## 2022-07-12 NOTE — TELEPHONE ENCOUNTER
----- Message from [facility] sent at 7/12/2022  5:00 PM EDT -----  Subject: Medication Problem    Medication: gabapentin (NEURONTIN) 400 MG capsule  Dosage: 800mg  Ordering Provider:     Question/Problem: Dr. Rufina Mas sent over 400 mg instaed of 800 mg pt is going   to run out of prescription before appt.  please contact pt back Please   resend 800 mg     Pharmacy: [facility], [address] [place] [address] [phone]    ---------------------------------------------------------------------------  --------------  Nazia MORIN  [phone]; OK to leave message on voicemail  ---------------------------------------------------------------------------  --------------    SCRIPT ANSWERS  Relationship to Patient: Self

## 2022-07-13 NOTE — TELEPHONE ENCOUNTER
No need for more medication.  Please schedule pt with DR MONGE next week since he will be back in town and he is PCP

## 2022-07-18 ENCOUNTER — OFFICE VISIT (OUTPATIENT)
Dept: FAMILY MEDICINE CLINIC | Age: 57
End: 2022-07-18
Payer: COMMERCIAL

## 2022-07-18 VITALS
OXYGEN SATURATION: 98 % | SYSTOLIC BLOOD PRESSURE: 128 MMHG | WEIGHT: 219.2 LBS | BODY MASS INDEX: 27.4 KG/M2 | DIASTOLIC BLOOD PRESSURE: 82 MMHG | HEART RATE: 98 BPM

## 2022-07-18 DIAGNOSIS — Z23 NEED FOR VACCINATION: Primary | ICD-10-CM

## 2022-07-18 DIAGNOSIS — G60.9 IDIOPATHIC PERIPHERAL NEUROPATHY: ICD-10-CM

## 2022-07-18 DIAGNOSIS — Z72.0 TOBACCO USE: ICD-10-CM

## 2022-07-18 DIAGNOSIS — Z00.00 ANNUAL PHYSICAL EXAM: ICD-10-CM

## 2022-07-18 DIAGNOSIS — N52.9 ERECTILE DYSFUNCTION, UNSPECIFIED ERECTILE DYSFUNCTION TYPE: ICD-10-CM

## 2022-07-18 DIAGNOSIS — Z12.11 COLON CANCER SCREENING: ICD-10-CM

## 2022-07-18 PROCEDURE — 90750 HZV VACC RECOMBINANT IM: CPT | Performed by: FAMILY MEDICINE

## 2022-07-18 PROCEDURE — 90471 IMMUNIZATION ADMIN: CPT | Performed by: FAMILY MEDICINE

## 2022-07-18 PROCEDURE — 99396 PREV VISIT EST AGE 40-64: CPT | Performed by: FAMILY MEDICINE

## 2022-07-18 RX ORDER — GABAPENTIN 800 MG/1
800 TABLET ORAL 3 TIMES DAILY
Qty: 90 TABLET | Refills: 5 | Status: SHIPPED | OUTPATIENT
Start: 2022-07-18 | End: 2022-08-17

## 2022-07-18 RX ORDER — GABAPENTIN 400 MG/1
CAPSULE ORAL
Qty: 180 CAPSULE | Refills: 5 | Status: SHIPPED | OUTPATIENT
Start: 2022-07-18 | End: 2022-11-02

## 2022-07-18 SDOH — ECONOMIC STABILITY: FOOD INSECURITY: WITHIN THE PAST 12 MONTHS, THE FOOD YOU BOUGHT JUST DIDN'T LAST AND YOU DIDN'T HAVE MONEY TO GET MORE.: NEVER TRUE

## 2022-07-18 SDOH — ECONOMIC STABILITY: FOOD INSECURITY: WITHIN THE PAST 12 MONTHS, YOU WORRIED THAT YOUR FOOD WOULD RUN OUT BEFORE YOU GOT MONEY TO BUY MORE.: NEVER TRUE

## 2022-07-18 ASSESSMENT — PATIENT HEALTH QUESTIONNAIRE - PHQ9
2. FEELING DOWN, DEPRESSED OR HOPELESS: 0
SUM OF ALL RESPONSES TO PHQ QUESTIONS 1-9: 0
SUM OF ALL RESPONSES TO PHQ QUESTIONS 1-9: 0
1. LITTLE INTEREST OR PLEASURE IN DOING THINGS: 0
SUM OF ALL RESPONSES TO PHQ9 QUESTIONS 1 & 2: 0
SUM OF ALL RESPONSES TO PHQ QUESTIONS 1-9: 0
SUM OF ALL RESPONSES TO PHQ QUESTIONS 1-9: 0

## 2022-07-18 ASSESSMENT — SOCIAL DETERMINANTS OF HEALTH (SDOH): HOW HARD IS IT FOR YOU TO PAY FOR THE VERY BASICS LIKE FOOD, HOUSING, MEDICAL CARE, AND HEATING?: NOT HARD AT ALL

## 2022-07-18 NOTE — PROGRESS NOTES
[address] Note    Date: 7/18/2022                                               Fausto Hernández:     Chief Complaint   Patient presents with    Hypertension     Med refills        HPI  Patient is here for annual exam.  Has never had a colonoscopy. Last Tdap 4 months ago. Patient has history of peripheral neuropathy. Currently takes gabapentin. Reports that symptoms are well controlled. Patient Active Problem List    Diagnosis Date Noted    Arnel's nodes (with arthropathy) 12/09/2020    Arthritis 12/09/2020    Idiopathic gout 12/09/2020    Dupuytren contracture 01/19/2016    Smoking 01/19/2016    Hypertension     Hepatic disease     Hyperlipidemia     Neuropathy, peripheral     Alcohol abuse        Past Medical History:   Diagnosis Date    Alcohol abuse     Gout     Hepatic disease     Hyperlipidemia     Hypertension     Neuropathy, peripheral        Current Outpatient Medications   Medication Sig Dispense Refill    gabapentin (NEURONTIN) 400 MG capsule Take 2 tablets 3 times per day 180 capsule 5    gabapentin (NEURONTIN) 800 MG tablet Take 1 tablet by mouth in the morning and 1 tablet at noon and 1 tablet before bedtime. Do all this for 30 days. 90 tablet 5    Vitamin D (CHOLECALCIFEROL) 25 MCG (1000 UT) TABS tablet TAKE 1 TABLET BY MOUTH EVERY DAY 30 tablet 5    indomethacin (INDOCIN) 25 MG capsule Take 1 capsule by mouth 3 times daily for 5 days 15 capsule 0     No current facility-administered medications for this visit.        No Known Allergies    Review of Systems  No CP, no SOB, no rash, no bruise, no HA, no vision change, no ankle swelling, no hearing problems, no LAD      Vitals:  /82   Pulse 98   Wt 219 lb 3.2 oz (99.4 kg)   SpO2 98%   BMI 27.40 kg/m²     Wt Readings from Last 3 Encounters:   07/18/22 219 lb 3.2 oz (99.4 kg)   03/21/22 220 lb (99.8 kg)   03/31/21 214 lb (97.1 kg)        Physical Exam  General:  Well-appearing, NAD, alert, non-toxic  HEENT: Normocephalic, atraumatic. Pupils equal and round. TMs pearly with good landmarks. Moist mucous membranes. Normal dentition  NECK:  Supple, normal range of motion, no LAD, no meningeal signs, no JVD, nontender  CHEST/LUNGS: CTAB, no crackles, no wheeze, no rhonchi. Symmetric rise  CARDIOVASCULAR: RRR,  no murmur, no rub  ABDOMEN: Soft, non-tender, non-distended. No masses  EXTREMETIES: Normal movement of all extremities. No edema. No joint swelling. SKIN:  No rash, no cellulitis, no bruising, no petechiae/purpura/vesicles/pustules/abscess  PSYCH:  A+O x 3; normal affect  NEURO:  GCS 15, CN2-12 grossly intact, no focal motor/sensory deficits, no cerebellar deficits, normal gait, normal speech      Assessment/Plan     68-year-old male here for annual exam.  Tdap is up-to-date. Patient is due for colon cancer screening, will refer to GI. Update Shingrix today. Check routine labs. Patient smoker, will check low-dose CT of the lung. Patient has history of idiopathic peripheral neuropathy. We will continue gabapentin. Overall is well controlled. Patient reports erectile dysfunction. Possibly due to hypogonadism. We will check testosterone levels. Discussed with patient medication/s dosage, instructions, potential S/E, A/R and Drug Interaction  Tylenol or Motrin as needed for pain or fever  Advise to return here if worse or go to nearest ER  Encourage fluids  Pt discharged in stable condition at 17:55      1. Colon cancer screening  -     Marc Walker MD, Gastroenterology, [facility]  2. Idiopathic peripheral neuropathy  -     gabapentin (NEURONTIN) 400 MG capsule; Take 2 tablets 3 times per day, Disp-180 capsule, R-5Normal  -     gabapentin (NEURONTIN) 800 MG tablet; Take 1 tablet by mouth in the morning and 1 tablet at noon and 1 tablet before bedtime. Do all this for 30 days. , Disp-90 tablet, R-5Normal  3.  Annual physical exam  -     CBC with Auto Differential; Future  -     Comprehensive Metabolic Panel; Future  -     Hemoglobin A1C; Future  -     Lipid, Fasting; Future  -     TSH with Reflex; Future  4. Tobacco use  -     CT Lung Screen (Initial or Annual); Future  5. Erectile dysfunction, unspecified erectile dysfunction type  -     Testosterone, free, total; Future       Orders Placed This Encounter   Procedures    CT Lung Screen (Initial or Annual)     Age: 64 y.o. Smoking History:   Social History    Tobacco Use      Smoking status: Every Day        Packs/day: 2.00        Years: 10.00        Pack years: 20        Types: Cigarettes      Smokeless tobacco: Never    Vaping Use      Vaping Use: Never used    Alcohol use: Yes      Alcohol/week: 12.0 standard drinks      Types: 12 Cans of beer per week      Comment: daily - 12 per day  and 1L jagger per day    Drug use: No    Pack years: 20  No previous lung cancer screening exam     Standing Status:   Future     Standing Expiration Date:   1/18/2024     Order Specific Question:   Is there documentation of shared decision making? Answer:   Yes     Order Specific Question:   Does the patient show any signs or symptoms of lung cancer? Answer:   No     Order Specific Question:   Is this the first (baseline) CT or an annual exam?     Answer:   Baseline [1]     Order Specific Question:   Is this a low dose CT or a routine CT? Answer:   Low Dose CT [1]     Order Specific Question:   Smoking Status? Answer:   Every Day [1]     Order Specific Question:   Smoking packs per day? Answer:   2     Order Specific Question:   Years smoking?      Answer:   10    CBC with Auto Differential     Standing Status:   Future     Standing Expiration Date:   7/18/2023    Comprehensive Metabolic Panel     Standing Status:   Future     Standing Expiration Date:   7/18/2023    Hemoglobin A1C     Standing Status:   Future     Standing Expiration Date:   7/18/2023    Lipid, Fasting     Standing Status:   Future     Standing Expiration Date:   7/18/2023    TSH with Reflex     Standing Status:   Future     Standing Expiration Date:   7/18/2023    Testosterone, free, total     Standing Status:   Future     Standing Expiration Date:   7/18/2023    TERRI - Shala Cornejo MD, Gastroenterology, [facility]     Referral Priority:   Routine     Referral Type:   Eval and Treat     Referral Reason:   Specialty Services Required     Referred to Provider:   Jay Lowe MD     Requested Specialty:   Gastroenterology     Number of Visits Requested:   1       No follow-ups on file.     Ivan Monk MD, MD    7/18/2022  5:55 PM

## 2022-08-03 DIAGNOSIS — E55.9 VITAMIN D DEFICIENCY: ICD-10-CM

## 2022-08-03 RX ORDER — MELATONIN
Qty: 30 TABLET | Refills: 5 | Status: SHIPPED | OUTPATIENT
Start: 2022-08-03

## 2022-10-12 ENCOUNTER — OFFICE VISIT (OUTPATIENT)
Dept: ORTHOPEDIC SURGERY | Age: 57
End: 2022-10-12
Payer: COMMERCIAL

## 2022-10-12 VITALS — RESPIRATION RATE: 16 BRPM | BODY MASS INDEX: 27.23 KG/M2 | WEIGHT: 219 LBS | HEIGHT: 75 IN

## 2022-10-12 DIAGNOSIS — M72.0 DUPUYTREN'S CONTRACTURE: Primary | ICD-10-CM

## 2022-10-12 PROCEDURE — G8419 CALC BMI OUT NRM PARAM NOF/U: HCPCS | Performed by: ORTHOPAEDIC SURGERY

## 2022-10-12 PROCEDURE — G8427 DOCREV CUR MEDS BY ELIG CLIN: HCPCS | Performed by: ORTHOPAEDIC SURGERY

## 2022-10-12 PROCEDURE — 3017F COLORECTAL CA SCREEN DOC REV: CPT | Performed by: ORTHOPAEDIC SURGERY

## 2022-10-12 PROCEDURE — 4004F PT TOBACCO SCREEN RCVD TLK: CPT | Performed by: ORTHOPAEDIC SURGERY

## 2022-10-12 PROCEDURE — G8484 FLU IMMUNIZE NO ADMIN: HCPCS | Performed by: ORTHOPAEDIC SURGERY

## 2022-10-12 PROCEDURE — 99213 OFFICE O/P EST LOW 20 MIN: CPT | Performed by: ORTHOPAEDIC SURGERY

## 2022-10-12 NOTE — PATIENT INSTRUCTIONS
Thank you for choosing [facility]) Physicians for your Hand and Upper Extremity needs. If we can be of any further assistance to you, please do not hesitate to contact us.     Office Phone Number:  [phone]  or  [phone]

## 2022-10-12 NOTE — Clinical Note
Dear  Lessie Dakins, MD,  Thank you very much for your referral or Mr. Tsering Caceres to me for evaluation and treatment of his Hand & Wrist condition. I appreciate your confidence in me and thank you for allowing me the opportunity to care for your patients. If I can be of any further assistance to you on this or any other patient, please do not hesitate to contact me. Sincerely,  Dena Vargas.  Olesya Payne MD

## 2022-10-12 NOTE — PROGRESS NOTES
Mr. Tito Villalobos  returns today regarding right Small Finger Dupuytren's Contracture for which he was last evaluated in February 2019. Treatment up to this time has included: No prior treatment from me, although he was scheduled for Xiaflex injection in 2019 but did not complete this treatment. He has noted the size of the palmar thickening has been increasing with time. He has noted any limitation of motion of the Small Finger; He does report any discomfort associated with his presenting concern. I have today reviewed with Tito Villalobos the clinically relevant, past medical history, medications, allergies,  family history, social history, and Review Of Systems & I have documented any details relevant to today's presenting complaints in my history above. Mr. Freddy Couch self-reported past medical history, medications, allergies,  family history, social history, and Review Of Systems have been scanned into the chart under the \"Media\" tab. Physical Exam:  Vitals  Resp: 16  Height: 6' 3\" (190.5 cm)  Weight: 219 lb (99.3 kg)  Mr. Tito Villalobos appears well, he is in no apparent distress, he demonstrates appropriate mood & affect. Skin: Normal Color, Free of Lesions, and Clinical evidence of Dupuytren's Contracture Bilateral   Thickening with cord formation is noted in the palm along the axis of the right Small Finger. There is  extension into the digit itself. There is a flexion contracture of the right Small Finger MP joint of 50 degrees & of the right Small Finger PIP joint of 60 degrees. All other digital range of motion is unchanged from prior examination bilaterally  Wrist range of motion is without significant limitation bilaterally  There is no evidence of gross joint instability bilaterally. Sensation is present bilaterally  Vascular examination reveals normal and good capillary refill bilaterally. There is no Swelling bilaterally.   Muscular strength is clinically appropriate bilaterally. He has significant ' Knuckle Pads\" over the proximal interphalangeal joints bilaterally   There is a 3.5 cm firm hard cord on the palm in the axis of the right Small Finger  with extension into the Small Finger  to the level of the Proximal Interphalangeal Joint. The \"Table Top Test\" is positive. Impression:  Mr. Carol Davidson is showing clinical evidence of Dupuytren's Contracture, and presents requesting further treatment. Plan:  After discussion of the treatment options available for Dupuytren's Contracture and review of his past treatments, I have outline for Mr. Carol Davidson the non-surgical treatment options of Clostridial Collagenase (Xiaflex) injection and subsequent Digital Manipulation, we have also discussed the surgical options of palmar and digital fasciectomy. We have discussed the details of the various procedures and the pre, judah and postinjection concerns and appropriate expectations after collagenase injection, as well as the pre, judah and postsurgical concerns and appropriate expectations after surgical treatment. We specifically discussed the possibility of recurrence, stiffness and residual discomfort as well as the possibility of transient or permanent tendon, neurological, or vascular dysfunction or injury related to both treatment options. He was given opportunity to ask questions and had them answered fully to his satisfaction. He voiced an understanding of the procedure and did express interest in right Small Finger Clostridial Collagenase (Xiaflex) injection and subsequent Digital Manipulation. We have agreed to investigate the feasibility of proceeding with Clostridial Collagenase (Xiaflex) injection, and Mr. Carol Davidson has provided consent to submit his information and insurance details to the \"Xiaflex Critical access hospital Program\" for the purposes of verification of insurance coverage for this procedure.     He will follow up with me over the next 1 - 2 weeks to further discuss the findings of his insurance verification process and to discuss and plan the next step for the treatment of his Dupuytren's Contracture. Mr. Carol Davidson has been given a full verbal list of instructions and precautions related to his present condition. I have asked him to followup with me in the office at the prescribed time. He is also specifically requested to call or return to the office sooner if his symptoms change or worsen prior to the next scheduled appointment.

## 2022-11-02 ENCOUNTER — OFFICE VISIT (OUTPATIENT)
Dept: ENT CLINIC | Age: 57
End: 2022-11-02
Payer: COMMERCIAL

## 2022-11-02 VITALS — SYSTOLIC BLOOD PRESSURE: 133 MMHG | HEART RATE: 85 BPM | DIASTOLIC BLOOD PRESSURE: 86 MMHG | TEMPERATURE: 96.9 F

## 2022-11-02 DIAGNOSIS — J34.89 NASAL OBSTRUCTION: Chronic | ICD-10-CM

## 2022-11-02 DIAGNOSIS — R43.0 ANOSMIA: Chronic | ICD-10-CM

## 2022-11-02 DIAGNOSIS — J34.2 NASAL SEPTAL DEVIATION: Primary | Chronic | ICD-10-CM

## 2022-11-02 DIAGNOSIS — J34.3 HYPERTROPHY OF BOTH INFERIOR NASAL TURBINATES: Chronic | ICD-10-CM

## 2022-11-02 PROCEDURE — 99214 OFFICE O/P EST MOD 30 MIN: CPT | Performed by: OTOLARYNGOLOGY

## 2022-11-02 PROCEDURE — 3078F DIAST BP <80 MM HG: CPT | Performed by: OTOLARYNGOLOGY

## 2022-11-02 PROCEDURE — 3017F COLORECTAL CA SCREEN DOC REV: CPT | Performed by: OTOLARYNGOLOGY

## 2022-11-02 PROCEDURE — 4004F PT TOBACCO SCREEN RCVD TLK: CPT | Performed by: OTOLARYNGOLOGY

## 2022-11-02 PROCEDURE — 3074F SYST BP LT 130 MM HG: CPT | Performed by: OTOLARYNGOLOGY

## 2022-11-02 PROCEDURE — G8427 DOCREV CUR MEDS BY ELIG CLIN: HCPCS | Performed by: OTOLARYNGOLOGY

## 2022-11-02 PROCEDURE — G8419 CALC BMI OUT NRM PARAM NOF/U: HCPCS | Performed by: OTOLARYNGOLOGY

## 2022-11-02 PROCEDURE — G8484 FLU IMMUNIZE NO ADMIN: HCPCS | Performed by: OTOLARYNGOLOGY

## 2022-11-02 ASSESSMENT — ENCOUNTER SYMPTOMS
RHINORRHEA: 0
SORE THROAT: 0
SINUS PAIN: 0

## 2022-11-02 NOTE — PROGRESS NOTES
[facility]       NEW PATIENT VISIT          PCP:  Sal Dan MD      CHIEF COMPLAINT  Chief Complaint   Patient presents with    Nasal deviated septum    Nasal Congestion       HISTORY OF PRESENT ILLNESS    Teodoro Eaton is a 62 y.o. male here for recheck and follow up of nasal septum deviation inferior turbinate hypertrophy and nasal dyspnea, treated with fluticasone and Zyrtec.  patient stated that Fluticasone and zyrtec did not help. Left nose always clogged. He has anosmia and hearing loss. REVIEW OF SYSTEMS   Review of Systems   Constitutional:  Negative for chills and fever. HENT:  Negative for ear discharge, ear pain, rhinorrhea, sinus pain and sore throat. PAST MEDICAL HISTORY    Past Medical History:   Diagnosis Date    Alcohol abuse     Gout     Hepatic disease     Hyperlipidemia     Hypertension     Neuropathy, peripheral        Past Surgical History:   Procedure Laterality Date    MOUTH SURGERY           EXAMINATION    Vitals:    11/02/22 1311   BP: 133/86   Pulse: 85   Temp: 96.9 °F (36.1 °C)   TempSrc: Temporal     General:  WDWN, NAD, alert and oriented  Face: There was no swelling or lesions detected. Voice: Normal with no hoarseness or hot potato voice. Ears: The external ears, mastoids, TMs and EACs appeared to be normal. .      (+) Nose:  Severe leftward NSD with near total left nasal airway obstruction. Right IT hypertrophy. The external nose, turbinates, secretions, and mucosa appeared to be normal.   Sinuses:  Maxillary and frontal sinuses were nontender to palpation and percussion.     Oral cavity:  Mucosa, secretions, tongue, and gingiva appeared to be normal.   Oropharynx:  The palatine tonsils, hard and soft palates, uvula, tongue, posterior oropharyngeal wall, mucosa and secretions appeared to be normal.     Salivary Glands:  Normal bilateral parotid and bilateral submandibular salivary glands. Neck:  No masses or tenderness. Trachea midline. Laryngeal cartilages and hyoid bone normal.  Normal laryngeal crepitus. Thyroid:  Normal, nontender, no goiter or nodules palpable. Lymph nodes:  No cervical lymphadenopathy. IMPRESSION / Ismael Charles / Marcy Casey was seen today for nasal deviated septum and nasal congestion. Diagnoses and all orders for this visit:    Nasal septal deviation    Hypertrophy of both inferior nasal turbinates    Nasal obstruction    Anosmia       RECOMMENDATIONS/PLAN      Septal reconstruction and inferior turbinate reduction. Return for post op check.

## 2022-12-05 ENCOUNTER — TELEPHONE (OUTPATIENT)
Dept: ENT CLINIC | Age: 57
End: 2022-12-05

## 2023-01-09 ENCOUNTER — OFFICE VISIT (OUTPATIENT)
Dept: FAMILY MEDICINE CLINIC | Age: 58
End: 2023-01-09
Payer: COMMERCIAL

## 2023-01-09 VITALS
SYSTOLIC BLOOD PRESSURE: 144 MMHG | DIASTOLIC BLOOD PRESSURE: 98 MMHG | WEIGHT: 215.8 LBS | OXYGEN SATURATION: 98 % | BODY MASS INDEX: 26.97 KG/M2 | HEART RATE: 80 BPM

## 2023-01-09 DIAGNOSIS — I10 ESSENTIAL HYPERTENSION: ICD-10-CM

## 2023-01-09 DIAGNOSIS — K42.9 UMBILICAL HERNIA WITHOUT OBSTRUCTION AND WITHOUT GANGRENE: Primary | ICD-10-CM

## 2023-01-09 PROCEDURE — G8419 CALC BMI OUT NRM PARAM NOF/U: HCPCS | Performed by: NURSE PRACTITIONER

## 2023-01-09 PROCEDURE — 3077F SYST BP >= 140 MM HG: CPT | Performed by: NURSE PRACTITIONER

## 2023-01-09 PROCEDURE — 4004F PT TOBACCO SCREEN RCVD TLK: CPT | Performed by: NURSE PRACTITIONER

## 2023-01-09 PROCEDURE — 3017F COLORECTAL CA SCREEN DOC REV: CPT | Performed by: NURSE PRACTITIONER

## 2023-01-09 PROCEDURE — G8484 FLU IMMUNIZE NO ADMIN: HCPCS | Performed by: NURSE PRACTITIONER

## 2023-01-09 PROCEDURE — 3080F DIAST BP >= 90 MM HG: CPT | Performed by: NURSE PRACTITIONER

## 2023-01-09 PROCEDURE — G8427 DOCREV CUR MEDS BY ELIG CLIN: HCPCS | Performed by: NURSE PRACTITIONER

## 2023-01-09 PROCEDURE — 99213 OFFICE O/P EST LOW 20 MIN: CPT | Performed by: NURSE PRACTITIONER

## 2023-01-09 RX ORDER — LISINOPRIL 20 MG/1
20 TABLET ORAL DAILY
Qty: 90 TABLET | Refills: 1 | Status: SHIPPED | OUTPATIENT
Start: 2023-01-09 | End: 2023-07-08

## 2023-01-09 ASSESSMENT — ENCOUNTER SYMPTOMS
BACK PAIN: 0
CONSTIPATION: 0
DIARRHEA: 0
ABDOMINAL PAIN: 1
VOMITING: 0
SHORTNESS OF BREATH: 0
NAUSEA: 0

## 2023-01-09 ASSESSMENT — PATIENT HEALTH QUESTIONNAIRE - PHQ9
SUM OF ALL RESPONSES TO PHQ QUESTIONS 1-9: 0
1. LITTLE INTEREST OR PLEASURE IN DOING THINGS: 0
SUM OF ALL RESPONSES TO PHQ QUESTIONS 1-9: 0
SUM OF ALL RESPONSES TO PHQ QUESTIONS 1-9: 0
2. FEELING DOWN, DEPRESSED OR HOPELESS: 0
SUM OF ALL RESPONSES TO PHQ9 QUESTIONS 1 & 2: 0
SUM OF ALL RESPONSES TO PHQ QUESTIONS 1-9: 0

## 2023-01-09 NOTE — PROGRESS NOTES
SUBJECTIVE:  Pt is a of 62 y.o. male comes in today with   Chief Complaint   Patient presents with    Hernia     Patient states he have possible hernia       Patient presenting today for evaluation of possible hernia. States umbilical hernia for many, many years. New job where he is lifting anywhere from 50-80 lbs. Now hernia painful and larger in size x 1 week. Denies fevers, nausea, vomiting, or changes to bowels. Slight discomfort unless lifting then moderate to severe pain. HTN: previously taking Lisinopril 20 mg daily. Stopped taking couple years ago, not sure why it was stopped. He denies CP, SOB, palpitations, HA or pedal edema. Prior to Visit Medications    Medication Sig Taking? Authorizing Provider   vitamin D3 (CHOLECALCIFEROL) 25 MCG (1000 UT) TABS tablet TAKE 1 TABLET BY MOUTH EVERY DAY Yes Jesus Vital MD   gabapentin (NEURONTIN) 400 MG capsule Take 2 tablets 3 times per day Yes Jesus Vital MD   gabapentin (NEURONTIN) 800 MG tablet Take 1 tablet by mouth in the morning and 1 tablet at noon and 1 tablet before bedtime. Do all this for 30 days. Yes Jesus Vital MD   indomethacin (INDOCIN) 25 MG capsule Take 1 capsule by mouth 3 times daily for 5 days Yes GERMÁN Almaguer - CNP         Patient's allergies, past medical, surgical, social and family histories werereviewed and updated as appropriate. Review of Systems   Constitutional:  Negative for chills and fever. Respiratory:  Negative for shortness of breath. Cardiovascular:  Negative for chest pain, palpitations and leg swelling. Gastrointestinal:  Positive for abdominal pain. Negative for constipation, diarrhea, nausea and vomiting. Anal bleeding: painful hernia. Genitourinary:  Negative for dysuria, flank pain, frequency, hematuria and urgency. Musculoskeletal:  Negative for back pain. Physical Exam  Constitutional:       Appearance: He is well-developed.    Cardiovascular:      Rate and Rhythm: Normal rate and regular rhythm. Pulmonary:      Effort: Pulmonary effort is normal.      Breath sounds: Normal breath sounds. Abdominal:      Hernia: A hernia is present. Hernia is present in the umbilical area (TTP). Skin:     General: Skin is warm and dry. Neurological:      Mental Status: He is alert and oriented to person, place, and time. Vitals:    01/09/23 0839 01/09/23 0915   BP: (!) 184/98 (!) 144/98   Pulse: (!) 102 80   SpO2: 98%    Weight: 215 lb 12.8 oz (97.9 kg)              ASSESSMENT:  1. Umbilical hernia without obstruction and without gangrene    2. Essential hypertension        PLAN:  1. Umbilical hernia without obstruction and without gangrene  -     Elizabeth Henry MD, General Surgery, [facility]  Recommend avoid lifting greater than 10 lbs    2. Essential hypertension  Uncontrolled   Resume asap-     lisinopril (PRINIVIL;ZESTRIL) 20 MG tablet; Take 1 tablet by mouth daily  See pt instructions  F/u 2-4 weeks for BP check  Discussed use, benefit, and side effects of prescribed medications. All patient questions answered. Pt voiced understanding.

## 2023-01-10 ENCOUNTER — INITIAL CONSULT (OUTPATIENT)
Dept: SURGERY | Age: 58
End: 2023-01-10
Payer: COMMERCIAL

## 2023-01-10 ENCOUNTER — TELEPHONE (OUTPATIENT)
Dept: ENT CLINIC | Age: 58
End: 2023-01-10

## 2023-01-10 VITALS — BODY MASS INDEX: 27 KG/M2 | DIASTOLIC BLOOD PRESSURE: 70 MMHG | WEIGHT: 216 LBS | SYSTOLIC BLOOD PRESSURE: 137 MMHG

## 2023-01-10 DIAGNOSIS — K42.9 UMBILICAL HERNIA WITHOUT OBSTRUCTION AND WITHOUT GANGRENE: Primary | ICD-10-CM

## 2023-01-10 PROCEDURE — 3078F DIAST BP <80 MM HG: CPT | Performed by: SURGERY

## 2023-01-10 PROCEDURE — G8427 DOCREV CUR MEDS BY ELIG CLIN: HCPCS | Performed by: SURGERY

## 2023-01-10 PROCEDURE — 99243 OFF/OP CNSLTJ NEW/EST LOW 30: CPT | Performed by: SURGERY

## 2023-01-10 PROCEDURE — 3075F SYST BP GE 130 - 139MM HG: CPT | Performed by: SURGERY

## 2023-01-10 PROCEDURE — G8484 FLU IMMUNIZE NO ADMIN: HCPCS | Performed by: SURGERY

## 2023-01-10 PROCEDURE — G8419 CALC BMI OUT NRM PARAM NOF/U: HCPCS | Performed by: SURGERY

## 2023-01-10 ASSESSMENT — ENCOUNTER SYMPTOMS
BACK PAIN: 0
ABDOMINAL DISTENTION: 1
APNEA: 0
ABDOMINAL PAIN: 1
COLOR CHANGE: 0
EYE DISCHARGE: 0
CHEST TIGHTNESS: 0
EYE ITCHING: 0

## 2023-01-10 NOTE — PROGRESS NOTES
[facility]        PATIENT NAME: Sammy Kirby     TODAY'S DATE: 1/10/2023    Reason for Consult:  Hernia    Requesting Physician:  ALICJA Israel CNP    HISTORY OF PRESENT ILLNESS:              The patient is a 57 y.o. male who presents with concerns of a hernia, umbilical area, noted slight bulge a long time, then has had an increasing size to the area, has mild local pain, more with pressure. No acute GI issues. No skin color change, no F/C.    The patient has not had prior hernia surgery.      Past Medical History:        Diagnosis Date    Alcohol abuse     Gout     Hepatic disease     Hyperlipidemia     Hypertension     Neuropathy, peripheral        Past Surgical History:        Procedure Laterality Date    MOUTH SURGERY         Current Medications:   No current facility-administered medications for this visit.  Prior to Admission medications    Medication Sig Start Date End Date Taking? Authorizing Provider   lisinopril (PRINIVIL;ZESTRIL) 20 MG tablet Take 1 tablet by mouth daily 1/9/23 7/8/23 Yes GERMÁN Figueroa CNP   vitamin D3 (CHOLECALCIFEROL) 25 MCG (1000 UT) TABS tablet TAKE 1 TABLET BY MOUTH EVERY DAY 8/3/22  Yes Jim Latif MD   gabapentin (NEURONTIN) 400 MG capsule Take 2 tablets 3 times per day 7/18/22 1/9/23  Jim Latif MD   gabapentin (NEURONTIN) 800 MG tablet Take 1 tablet by mouth in the morning and 1 tablet at noon and 1 tablet before bedtime. Do all this for 30 days. 7/18/22 1/9/23  Jim Latif MD   indomethacin (INDOCIN) 25 MG capsule Take 1 capsule by mouth 3 times daily for 5 days 12/9/20 1/9/23  GERMÁN Roblero - CNP        Allergies:  Patient has no known allergies.    Social History:    reports that he has been smoking cigarettes. He has a 20.00 pack-year smoking history. He has never used smokeless tobacco. He reports current alcohol use of about 12.0 standard drinks per week. He reports that he does not use drugs.    Family  History:        Problem Relation Age of Onset    Cancer Father         lung    Diabetes Maternal Grandmother     Diabetes Maternal Grandfather     Diabetes Paternal Grandmother     Diabetes Paternal Grandfather        REVIEW OF SYSTEMS:  Review of Systems   Constitutional:  Negative for activity change and appetite change.   HENT:  Negative for congestion and dental problem.    Eyes:  Negative for discharge and itching.   Respiratory:  Negative for apnea and chest tightness.    Cardiovascular:  Negative for chest pain and leg swelling.   Gastrointestinal:  Positive for abdominal distention and abdominal pain.   Endocrine: Negative for cold intolerance and heat intolerance.   Genitourinary:  Negative for difficulty urinating and dysuria.   Musculoskeletal:  Negative for arthralgias and back pain.   Skin:  Negative for color change and pallor.   Allergic/Immunologic: Negative for environmental allergies and food allergies.   Neurological:  Negative for dizziness and facial asymmetry.   Hematological:  Negative for adenopathy. Does not bruise/bleed easily.   Psychiatric/Behavioral:  Negative for agitation and behavioral problems.      PHYSICAL EXAM:  VITALS:  Wt 216 lb (98 kg)   BMI 27.00 kg/m²   CONSTITUTIONAL:  alert, no apparent distress and normal weight  EYES:  sclera clear  ENT:  normocepalic, without obvious abnormality  NECK:  supple, symmetrical, trachea midline  LUNGS:  clear to auscultation  CARDIOVASCULAR:  regular rate and rhythm  ABDOMEN:  no scars, normal bowel sounds, soft, non-distended, tenderness noted at the umbilicus, voluntary guarding absent, no masses palpated, no hepatosplenomegally and hernia present - umbilical  MUSCULOSKELETAL:  0+ edema lower extremities  NEUROLOGIC:  Mental Status Exam:  Level of Alertness:   awake  Orientation:   person, place, time  SKIN:  no bruising or bleeding, normal skin color, texture, turgor, and no redness, warmth, or swelling    DATA:        Radiology Review:    None    IMPRESSION/RECOMMENDATIONS:    Umbilical hernia without incarceration.     Umbilical hernia repair with mesh will be scheduled.    The plan for surgery has been reviewed in detail today. Risks and benefits have been reviewed, and all questions answered.     Schedule as outpt at [facility], thank you,    Patrick Osorio MD

## 2023-01-10 NOTE — TELEPHONE ENCOUNTER
Patient is calling to cancel his surgery. He has to get another procedure done first. He states he will call back at a different time to reschedule.

## 2023-01-12 NOTE — TELEPHONE ENCOUNTER
Called patient to confirm we were canceling the surgery. Patient stated he is having a hernia surgery. Patient will call back once he is feeling better after the surgery to schedule surgery. Informed the patient that depending on when he is ready for surgery we may need him to come in for another appointment before surgery due to it being so long since we last seen them.

## 2023-01-13 NOTE — PROGRESS NOTES
Name_______________________________________Printed:____________________  Date and time of surgery__1/23/2023______1100________________Arrival Time:____0930____________   1. The instructions given regarding when and if a patient needs to stop oral intake prior to surgery varies. Follow the specific instructions you were given                  _x__Nothing to eat or to drink after Midnight the night before.                   ____Carbo loading or ERAS instructions will be given to select patients-if you have been given those instructions -please do the following                           The evening before your surgery after dinner before midnight drink 40 ounces of gatorade. If you are diabetic use sugar free. The morning of surgery drink 40 ounces of water. This needs to be finished 3 hours prior to your surgery start time. 2. Take the following pills with a small sip of water on the morning of surgery____gabapentin_______________________________________________                  Do not take blood pressure medications ending in pril or sartan the sanaz prior to surgery or the morning of surgery. Dr Josseline Hess patient are not to take any medications the AM of surgery. 3. Aspirin, Ibuprofen, Advil, Naproxen, Vitamin E and other Anti-inflammatory products and supplements should be stopped for 5 -7days before surgery or as directed by your physician. 4. Check with your Doctor regarding stopping Plavix, Coumadin,Eliquis, Lovenox,Effient,Pradaxa,Xarelto, Fragmin or other blood thinners and follow their instructions. 5. Do not smoke, and do not drink any alcoholic beverages 24 hours prior to surgery. This includes NA Beer. Refrain from the usage of any recreational drugs. 6. You may brush your teeth and gargle the morning of surgery. DO NOT SWALLOW WATER   7. You MUST make arrangements for a responsible adult to stay on site while you are here and take you home after your surgery.  You will not be allowed to leave alone or drive yourself home. It is strongly suggested someone stay with you the first 24 hrs. Your surgery will be cancelled if you do not have a ride home. 8. A parent/legal guardian must accompany a child scheduled for surgery and plan to stay at the hospital until the child is discharged. Please do not bring other children with you. 9. Please wear simple, loose fitting clothing to the hospital.  Yohannes Boateng not bring valuables (money, credit cards, checkbooks, etc.) Do not wear any makeup (including no eye makeup) or nail polish on your fingers or toes. 10. DO NOT wear any jewelry or piercings on day of surgery. All body piercing jewelry must be removed. 11. If you have ___dentures, they will be removed before going to the OR; we will provide you a container. If you wear ___contact lenses or ___glasses, they will be removed; please bring a case for them. 12. Please see your family doctor/pediatrician for a history & physical and/or concerning medications. Bring any test results/reports from your physician's office. PCP__________________Phone___________H&P Appt. Date________             13 If you  have a Living Will and Durable Power of  for Healthcare, please bring in a copy. 15. Notify your Surgeon if you develop any illness between now and surgery  time, cough, cold, fever, sore throat, nausea, vomiting, etc.  Please notify your surgeon if you experience dizziness, shortness of breath or blurred vision between now & the time of your surgery             15. DO NOT shave your operative site 96 hours prior to surgery. For face & neck surgery, men may use an electric razor 48 hours prior to surgery. 16. Shower the night before or morning of surgery using an antibacterial soap or as you have been instructed. 17. To provide excellent care visitors will be limited to one in the room at any given time.              18.  Please bring picture ID and insurance card. 19.  Visit our web site for additional information:  "[email or web address][email or web address]              20.During flu season no children under the age of 15 are permitted in the hospital for the safety of all patients. 21. If you take a long acting insulin in the evening only  take half of your usual  dose the night  before your procedure              22. If you use a c-pap please bring DOS if staying overnight,             23.For your convenience [facility] has a pharmacy on site to fill your prescriptions. 24. If you use oxygen and have a portable tank please bring it  with you the DOS             25. Bring a complete list of all your medications with name and dose include any supplements. 26. Other__________________________________________   *Please call pre admission testing if you any further questions   [facility]eth [facility]achD[facility]   [address]    [address]  [phone]   [address] [place] [address]       VISITOR POLICY(subject to change)    Current policy is 2 visitors per patient. No children. Mask is  at the discretion of the facility. Visiting hours are 8a-8p. Overnight visitors will be at the discretion of the nurse. All policies subject to change. All above information reviewed with patient in person or by phone. Patient verbalizes understanding. All questions and concerns addressed.                                                                                                  Patient/Rep____patient________________                                                                                                                                    PRE OP INSTRUCTIONS

## 2023-01-17 DIAGNOSIS — G60.9 IDIOPATHIC PERIPHERAL NEUROPATHY: ICD-10-CM

## 2023-01-17 DIAGNOSIS — N52.9 ERECTILE DYSFUNCTION, UNSPECIFIED ERECTILE DYSFUNCTION TYPE: ICD-10-CM

## 2023-01-17 DIAGNOSIS — Z00.00 ANNUAL PHYSICAL EXAM: ICD-10-CM

## 2023-01-17 LAB
A/G RATIO: 1.6 (ref 1.1–2.2)
ALBUMIN SERPL-MCNC: 4.4 G/DL (ref 3.4–5)
ALP BLD-CCNC: 159 U/L (ref 40–129)
ALT SERPL-CCNC: 21 U/L (ref 10–40)
ANION GAP SERPL CALCULATED.3IONS-SCNC: 11 MMOL/L (ref 3–16)
AST SERPL-CCNC: 38 U/L (ref 15–37)
BASOPHILS ABSOLUTE: 0 K/UL (ref 0–0.2)
BASOPHILS RELATIVE PERCENT: 0.2 %
BILIRUB SERPL-MCNC: 0.5 MG/DL (ref 0–1)
BUN BLDV-MCNC: 9 MG/DL (ref 7–20)
CALCIUM SERPL-MCNC: 9.2 MG/DL (ref 8.3–10.6)
CHLORIDE BLD-SCNC: 100 MMOL/L (ref 99–110)
CHOLESTEROL, FASTING: 169 MG/DL (ref 0–199)
CO2: 33 MMOL/L (ref 21–32)
CREAT SERPL-MCNC: 0.9 MG/DL (ref 0.9–1.3)
EOSINOPHILS ABSOLUTE: 0.2 K/UL (ref 0–0.6)
EOSINOPHILS RELATIVE PERCENT: 2.5 %
GFR SERPL CREATININE-BSD FRML MDRD: >60 ML/MIN/{1.73_M2}
GLUCOSE BLD-MCNC: 95 MG/DL (ref 70–99)
HCT VFR BLD CALC: 46.2 % (ref 40.5–52.5)
HDLC SERPL-MCNC: 43 MG/DL (ref 40–60)
HEMOGLOBIN: 15.5 G/DL (ref 13.5–17.5)
LDL CHOLESTEROL CALCULATED: 105 MG/DL
LYMPHOCYTES ABSOLUTE: 1.9 K/UL (ref 1–5.1)
LYMPHOCYTES RELATIVE PERCENT: 30.4 %
MCH RBC QN AUTO: 37.3 PG (ref 26–34)
MCHC RBC AUTO-ENTMCNC: 33.6 G/DL (ref 31–36)
MCV RBC AUTO: 110.9 FL (ref 80–100)
MONOCYTES ABSOLUTE: 0.4 K/UL (ref 0–1.3)
MONOCYTES RELATIVE PERCENT: 6.7 %
NEUTROPHILS ABSOLUTE: 3.8 K/UL (ref 1.7–7.7)
NEUTROPHILS RELATIVE PERCENT: 60.2 %
PDW BLD-RTO: 16.6 % (ref 12.4–15.4)
PLATELET # BLD: 144 K/UL (ref 135–450)
PMV BLD AUTO: 9.6 FL (ref 5–10.5)
POTASSIUM SERPL-SCNC: 4.4 MMOL/L (ref 3.5–5.1)
RBC # BLD: 4.16 M/UL (ref 4.2–5.9)
SODIUM BLD-SCNC: 144 MMOL/L (ref 136–145)
TOTAL PROTEIN: 7.2 G/DL (ref 6.4–8.2)
TRIGLYCERIDE, FASTING: 103 MG/DL (ref 0–150)
TSH REFLEX: 2.08 UIU/ML (ref 0.27–4.2)
VLDLC SERPL CALC-MCNC: 21 MG/DL
WBC # BLD: 6.4 K/UL (ref 4–11)

## 2023-01-17 NOTE — TELEPHONE ENCOUNTER
Medication:   Requested Prescriptions     Pending Prescriptions Disp Refills    gabapentin (NEURONTIN) 800 MG tablet [Pharmacy Med Name: GABAPENTIN 800 MG TABLET] 90 tablet 5     Sig: TAKE 1 TABLET BY MOUTH IN THE MORNING AND 1 TABLET AT NOON AND 1 TABLET BEFORE BEDTIME. DO ALL THIS FOR 30 DAYS. Last Filled:  7/18/2022    Patient Phone Number: [phone] (home) [phone] (work)    Last appt: 1/9/2023   Next appt: Visit date not found    Last OARRS: No flowsheet data found.

## 2023-01-17 NOTE — TELEPHONE ENCOUNTER
Medication and Quantity requested:      gabapentin (NEURONTIN) 800 MG tablet [[id number]]        Last Visit  01/09/2023    Pharmacy and phone number updated in EPIC:  yes    [facility]

## 2023-01-17 NOTE — TELEPHONE ENCOUNTER
----- Message from Marquis Jocelyn MA sent at 1/17/2023  2:05 PM EST -----  Subject: Refill Request    QUESTIONS  Name of Medication? gabapentin (NEURONTIN) 800 MG tablet  Patient-reported dosage and instructions? 1 tablet 3 times daily  How many days do you have left? 1  Preferred Pharmacy? [id number]  Pharmacy phone number (if available)? [phone]  ---------------------------------------------------------------------------  --------------  Viv MORIN  What is the best way for the office to contact you? OK to leave message on   voicemail  Preferred Call Back Phone Number? [phone]  ---------------------------------------------------------------------------  --------------  SCRIPT ANSWERS  Relationship to Patient?  Self

## 2023-01-18 LAB
ESTIMATED AVERAGE GLUCOSE: 114 MG/DL
HBA1C MFR BLD: 5.6 %

## 2023-01-18 RX ORDER — GABAPENTIN 800 MG/1
800 TABLET ORAL 3 TIMES DAILY
Qty: 270 TABLET | Refills: 1 | Status: SHIPPED | OUTPATIENT
Start: 2023-01-18 | End: 2023-04-18

## 2023-01-18 RX ORDER — GABAPENTIN 800 MG/1
800 TABLET ORAL 3 TIMES DAILY
Qty: 90 TABLET | Refills: 5 | OUTPATIENT
Start: 2023-01-18 | End: 2023-02-17

## 2023-01-18 NOTE — TELEPHONE ENCOUNTER
Medication:   Requested Prescriptions     Pending Prescriptions Disp Refills    gabapentin (NEURONTIN) 800 MG tablet 90 tablet 5     Sig: Take 1 tablet by mouth 3 times daily for 30 days. Last Filled:  7/18/2022, 90, 5    Patient Phone Number: [phone] (home) [phone] (work)    Last appt: 1/9/2023   Next appt: Visit date not found    Last OARRS: No flowsheet data found.

## 2023-01-18 NOTE — TELEPHONE ENCOUNTER
Patient called back regarding these encounters. Patient is wondering when this will be filled and sent to pharmacy. I informed patient we are still working on this and Im not sure why its taking so long.       Please advise previous encounters and call patient once sent to pharmacy [phone]

## 2023-01-18 NOTE — TELEPHONE ENCOUNTER
The patient called back wondering if this can be filled ASAP as he has been out for 2 days now. Pt stated he was told an \"excuse\" for why it has not been filled yet is because our systems crashed. I informed him yes they did crash. Apologized. Said we would be more than happy to fill it but I personally cannot do it myself      Pt is out and does not want to go on withdraw without this medication    At the end of the phone call the pt hung up on me, as he just wants his medication and he is very frustrated.         Please advise

## 2023-01-19 LAB
SEX HORMONE BINDING GLOBULIN: 44 NMOL/L (ref 11–80)
TESTOSTERONE FREE-NONMALE: 102.9 PG/ML (ref 47–244)
TESTOSTERONE TOTAL: 575 NG/DL (ref 220–1000)

## 2023-01-23 ENCOUNTER — ANESTHESIA (OUTPATIENT)
Dept: OPERATING ROOM | Age: 58
End: 2023-01-23
Payer: COMMERCIAL

## 2023-01-23 ENCOUNTER — ANESTHESIA EVENT (OUTPATIENT)
Dept: OPERATING ROOM | Age: 58
End: 2023-01-23
Payer: COMMERCIAL

## 2023-01-23 ENCOUNTER — HOSPITAL ENCOUNTER (OUTPATIENT)
Age: 58
Setting detail: OUTPATIENT SURGERY
Discharge: HOME | End: 2023-01-23
Attending: SURGERY | Admitting: SURGERY
Payer: COMMERCIAL

## 2023-01-23 VITALS
OXYGEN SATURATION: 94 % | WEIGHT: 212.8 LBS | SYSTOLIC BLOOD PRESSURE: 136 MMHG | BODY MASS INDEX: 26.46 KG/M2 | DIASTOLIC BLOOD PRESSURE: 72 MMHG | HEIGHT: 75 IN | HEART RATE: 78 BPM | TEMPERATURE: 98.1 F | RESPIRATION RATE: 18 BRPM

## 2023-01-23 DIAGNOSIS — K42.0 INCARCERATED UMBILICAL HERNIA: Primary | ICD-10-CM

## 2023-01-23 LAB
A/G RATIO: 1.7 (ref 1.1–2.2)
ALBUMIN SERPL-MCNC: 4.8 G/DL (ref 3.4–5)
ALP BLD-CCNC: 123 U/L (ref 40–129)
ALT SERPL-CCNC: 26 U/L (ref 10–40)
ANION GAP SERPL CALCULATED.3IONS-SCNC: 10 MMOL/L (ref 3–16)
APTT: 30.6 SEC (ref 23–34.3)
AST SERPL-CCNC: 34 U/L (ref 15–37)
BILIRUB SERPL-MCNC: 0.9 MG/DL (ref 0–1)
BUN BLDV-MCNC: 13 MG/DL (ref 7–20)
CALCIUM SERPL-MCNC: 9.1 MG/DL (ref 8.3–10.6)
CHLORIDE BLD-SCNC: 98 MMOL/L (ref 99–110)
CO2: 31 MMOL/L (ref 21–32)
CREAT SERPL-MCNC: 1 MG/DL (ref 0.9–1.3)
GFR SERPL CREATININE-BSD FRML MDRD: >60 ML/MIN/{1.73_M2}
GLUCOSE BLD-MCNC: 137 MG/DL (ref 70–99)
HCT VFR BLD CALC: 46.1 % (ref 40.5–52.5)
HEMOGLOBIN: 15.7 G/DL (ref 13.5–17.5)
INR BLD: 1.08 (ref 0.87–1.14)
MCH RBC QN AUTO: 37.1 PG (ref 26–34)
MCHC RBC AUTO-ENTMCNC: 34 G/DL (ref 31–36)
MCV RBC AUTO: 109.2 FL (ref 80–100)
PDW BLD-RTO: 17.1 % (ref 12.4–15.4)
PLATELET # BLD: 133 K/UL (ref 135–450)
PMV BLD AUTO: 7.6 FL (ref 5–10.5)
POTASSIUM SERPL-SCNC: 4.3 MMOL/L (ref 3.5–5.1)
PROTHROMBIN TIME: 14 SEC (ref 11.7–14.5)
RBC # BLD: 4.23 M/UL (ref 4.2–5.9)
SODIUM BLD-SCNC: 139 MMOL/L (ref 136–145)
TOTAL PROTEIN: 7.7 G/DL (ref 6.4–8.2)
WBC # BLD: 5.2 K/UL (ref 4–11)

## 2023-01-23 PROCEDURE — 2580000003 HC RX 258: Performed by: SURGERY

## 2023-01-23 PROCEDURE — 2500000003 HC RX 250 WO HCPCS: Performed by: SURGERY

## 2023-01-23 PROCEDURE — 3600000002 HC SURGERY LEVEL 2 BASE: Performed by: SURGERY

## 2023-01-23 PROCEDURE — 3600000012 HC SURGERY LEVEL 2 ADDTL 15MIN: Performed by: SURGERY

## 2023-01-23 PROCEDURE — 49592 RPR AA HRN 1ST < 3 NCR/STRN: CPT | Performed by: SURGERY

## 2023-01-23 PROCEDURE — 7100000001 HC PACU RECOVERY - ADDTL 15 MIN: Performed by: SURGERY

## 2023-01-23 PROCEDURE — 2709999900 HC NON-CHARGEABLE SUPPLY: Performed by: SURGERY

## 2023-01-23 PROCEDURE — 85730 THROMBOPLASTIN TIME PARTIAL: CPT

## 2023-01-23 PROCEDURE — 3700000001 HC ADD 15 MINUTES (ANESTHESIA): Performed by: SURGERY

## 2023-01-23 PROCEDURE — 6360000002 HC RX W HCPCS: Performed by: SURGERY

## 2023-01-23 PROCEDURE — 6360000002 HC RX W HCPCS: Performed by: ANESTHESIOLOGY

## 2023-01-23 PROCEDURE — 7100000011 HC PHASE II RECOVERY - ADDTL 15 MIN: Performed by: SURGERY

## 2023-01-23 PROCEDURE — 85610 PROTHROMBIN TIME: CPT

## 2023-01-23 PROCEDURE — 6360000002 HC RX W HCPCS: Performed by: NURSE ANESTHETIST, CERTIFIED REGISTERED

## 2023-01-23 PROCEDURE — 6370000000 HC RX 637 (ALT 250 FOR IP): Performed by: SURGERY

## 2023-01-23 PROCEDURE — 3700000000 HC ANESTHESIA ATTENDED CARE: Performed by: SURGERY

## 2023-01-23 PROCEDURE — 80053 COMPREHEN METABOLIC PANEL: CPT

## 2023-01-23 PROCEDURE — 7100000010 HC PHASE II RECOVERY - FIRST 15 MIN: Performed by: SURGERY

## 2023-01-23 PROCEDURE — C1781 MESH (IMPLANTABLE): HCPCS | Performed by: SURGERY

## 2023-01-23 PROCEDURE — 85027 COMPLETE CBC AUTOMATED: CPT

## 2023-01-23 PROCEDURE — 2500000003 HC RX 250 WO HCPCS: Performed by: NURSE ANESTHETIST, CERTIFIED REGISTERED

## 2023-01-23 PROCEDURE — 6360000002 HC RX W HCPCS

## 2023-01-23 PROCEDURE — 7100000000 HC PACU RECOVERY - FIRST 15 MIN: Performed by: SURGERY

## 2023-01-23 DEVICE — IMPLANTABLE DEVICE: Type: IMPLANTABLE DEVICE | Site: ABDOMEN | Status: FUNCTIONAL

## 2023-01-23 RX ORDER — MIDAZOLAM HYDROCHLORIDE 1 MG/ML
INJECTION INTRAMUSCULAR; INTRAVENOUS PRN
Status: DISCONTINUED | OUTPATIENT
Start: 2023-01-23 | End: 2023-01-23 | Stop reason: SDUPTHER

## 2023-01-23 RX ORDER — SUCCINYLCHOLINE CHLORIDE 20 MG/ML
INJECTION INTRAMUSCULAR; INTRAVENOUS PRN
Status: DISCONTINUED | OUTPATIENT
Start: 2023-01-23 | End: 2023-01-23 | Stop reason: SDUPTHER

## 2023-01-23 RX ORDER — BUPIVACAINE HYDROCHLORIDE AND EPINEPHRINE 5; 5 MG/ML; UG/ML
INJECTION, SOLUTION EPIDURAL; INTRACAUDAL; PERINEURAL
Status: COMPLETED | OUTPATIENT
Start: 2023-01-23 | End: 2023-01-23

## 2023-01-23 RX ORDER — MEPERIDINE HYDROCHLORIDE 25 MG/ML
12.5 INJECTION INTRAMUSCULAR; INTRAVENOUS; SUBCUTANEOUS EVERY 5 MIN PRN
Status: DISCONTINUED | OUTPATIENT
Start: 2023-01-23 | End: 2023-01-23 | Stop reason: HOSPADM

## 2023-01-23 RX ORDER — SODIUM CHLORIDE 0.9 % (FLUSH) 0.9 %
5-40 SYRINGE (ML) INJECTION PRN
Status: DISCONTINUED | OUTPATIENT
Start: 2023-01-23 | End: 2023-01-23 | Stop reason: HOSPADM

## 2023-01-23 RX ORDER — DEXAMETHASONE SODIUM PHOSPHATE 4 MG/ML
INJECTION, SOLUTION INTRA-ARTICULAR; INTRALESIONAL; INTRAMUSCULAR; INTRAVENOUS; SOFT TISSUE PRN
Status: DISCONTINUED | OUTPATIENT
Start: 2023-01-23 | End: 2023-01-23 | Stop reason: SDUPTHER

## 2023-01-23 RX ORDER — HYDROMORPHONE HCL 110MG/55ML
PATIENT CONTROLLED ANALGESIA SYRINGE INTRAVENOUS
Status: COMPLETED
Start: 2023-01-23 | End: 2023-01-23

## 2023-01-23 RX ORDER — LIDOCAINE HYDROCHLORIDE 20 MG/ML
INJECTION, SOLUTION INFILTRATION; PERINEURAL PRN
Status: DISCONTINUED | OUTPATIENT
Start: 2023-01-23 | End: 2023-01-23 | Stop reason: SDUPTHER

## 2023-01-23 RX ORDER — PROPOFOL 10 MG/ML
INJECTION, EMULSION INTRAVENOUS PRN
Status: DISCONTINUED | OUTPATIENT
Start: 2023-01-23 | End: 2023-01-23 | Stop reason: SDUPTHER

## 2023-01-23 RX ORDER — ONDANSETRON 2 MG/ML
INJECTION INTRAMUSCULAR; INTRAVENOUS PRN
Status: DISCONTINUED | OUTPATIENT
Start: 2023-01-23 | End: 2023-01-23 | Stop reason: SDUPTHER

## 2023-01-23 RX ORDER — DIPHENHYDRAMINE HYDROCHLORIDE 50 MG/ML
12.5 INJECTION INTRAMUSCULAR; INTRAVENOUS
Status: DISCONTINUED | OUTPATIENT
Start: 2023-01-23 | End: 2023-01-23 | Stop reason: HOSPADM

## 2023-01-23 RX ORDER — DEXMEDETOMIDINE HYDROCHLORIDE 100 UG/ML
INJECTION, SOLUTION INTRAVENOUS PRN
Status: DISCONTINUED | OUTPATIENT
Start: 2023-01-23 | End: 2023-01-23 | Stop reason: SDUPTHER

## 2023-01-23 RX ORDER — ROCURONIUM BROMIDE 10 MG/ML
INJECTION, SOLUTION INTRAVENOUS PRN
Status: DISCONTINUED | OUTPATIENT
Start: 2023-01-23 | End: 2023-01-23 | Stop reason: SDUPTHER

## 2023-01-23 RX ORDER — HYDROMORPHONE HCL 110MG/55ML
0.5 PATIENT CONTROLLED ANALGESIA SYRINGE INTRAVENOUS EVERY 5 MIN PRN
Status: DISCONTINUED | OUTPATIENT
Start: 2023-01-23 | End: 2023-01-23 | Stop reason: HOSPADM

## 2023-01-23 RX ORDER — SODIUM CHLORIDE, SODIUM LACTATE, POTASSIUM CHLORIDE, CALCIUM CHLORIDE 600; 310; 30; 20 MG/100ML; MG/100ML; MG/100ML; MG/100ML
INJECTION, SOLUTION INTRAVENOUS CONTINUOUS
Status: DISCONTINUED | OUTPATIENT
Start: 2023-01-23 | End: 2023-01-23 | Stop reason: HOSPADM

## 2023-01-23 RX ORDER — HYDROCODONE BITARTRATE AND ACETAMINOPHEN 5; 325 MG/1; MG/1
1 TABLET ORAL EVERY 4 HOURS PRN
Qty: 25 TABLET | Refills: 0 | Status: SHIPPED | OUTPATIENT
Start: 2023-01-23 | End: 2023-01-30

## 2023-01-23 RX ORDER — NALOXONE HYDROCHLORIDE 0.4 MG/ML
INJECTION, SOLUTION INTRAMUSCULAR; INTRAVENOUS; SUBCUTANEOUS PRN
Status: DISCONTINUED | OUTPATIENT
Start: 2023-01-23 | End: 2023-01-23 | Stop reason: SDUPTHER

## 2023-01-23 RX ORDER — HYDROCODONE BITARTRATE AND ACETAMINOPHEN 5; 325 MG/1; MG/1
1 TABLET ORAL ONCE
Status: COMPLETED | OUTPATIENT
Start: 2023-01-23 | End: 2023-01-23

## 2023-01-23 RX ORDER — HYDROMORPHONE HCL 110MG/55ML
0.25 PATIENT CONTROLLED ANALGESIA SYRINGE INTRAVENOUS EVERY 5 MIN PRN
Status: DISCONTINUED | OUTPATIENT
Start: 2023-01-23 | End: 2023-01-23 | Stop reason: HOSPADM

## 2023-01-23 RX ORDER — SODIUM CHLORIDE 9 MG/ML
INJECTION, SOLUTION INTRAVENOUS PRN
Status: DISCONTINUED | OUTPATIENT
Start: 2023-01-23 | End: 2023-01-23 | Stop reason: HOSPADM

## 2023-01-23 RX ORDER — ONDANSETRON 2 MG/ML
4 INJECTION INTRAMUSCULAR; INTRAVENOUS
Status: DISCONTINUED | OUTPATIENT
Start: 2023-01-23 | End: 2023-01-23 | Stop reason: HOSPADM

## 2023-01-23 RX ORDER — FENTANYL CITRATE 50 UG/ML
INJECTION, SOLUTION INTRAMUSCULAR; INTRAVENOUS PRN
Status: DISCONTINUED | OUTPATIENT
Start: 2023-01-23 | End: 2023-01-23 | Stop reason: SDUPTHER

## 2023-01-23 RX ORDER — SODIUM CHLORIDE 0.9 % (FLUSH) 0.9 %
5-40 SYRINGE (ML) INJECTION EVERY 12 HOURS SCHEDULED
Status: DISCONTINUED | OUTPATIENT
Start: 2023-01-23 | End: 2023-01-23 | Stop reason: HOSPADM

## 2023-01-23 RX ADMIN — ROCURONIUM BROMIDE 10 MG: 10 INJECTION INTRAVENOUS at 11:26

## 2023-01-23 RX ADMIN — MIDAZOLAM 2 MG: 1 INJECTION INTRAMUSCULAR; INTRAVENOUS at 11:19

## 2023-01-23 RX ADMIN — HYDROMORPHONE HYDROCHLORIDE 0.5 MG: 2 INJECTION, SOLUTION INTRAMUSCULAR; INTRAVENOUS; SUBCUTANEOUS at 12:33

## 2023-01-23 RX ADMIN — HYDROMORPHONE HYDROCHLORIDE 0.5 MG: 2 INJECTION, SOLUTION INTRAMUSCULAR; INTRAVENOUS; SUBCUTANEOUS at 12:59

## 2023-01-23 RX ADMIN — DEXMEDETOMIDINE HYDROCHLORIDE 10 MCG: 100 INJECTION, SOLUTION INTRAVENOUS at 11:55

## 2023-01-23 RX ADMIN — SUGAMMADEX 200 MG: 100 INJECTION, SOLUTION INTRAVENOUS at 12:10

## 2023-01-23 RX ADMIN — DEXMEDETOMIDINE HYDROCHLORIDE 10 MCG: 100 INJECTION, SOLUTION INTRAVENOUS at 11:39

## 2023-01-23 RX ADMIN — PROPOFOL 200 MG: 10 INJECTION, EMULSION INTRAVENOUS at 11:26

## 2023-01-23 RX ADMIN — CEFAZOLIN 2000 MG: 2 INJECTION, POWDER, FOR SOLUTION INTRAMUSCULAR; INTRAVENOUS at 11:16

## 2023-01-23 RX ADMIN — SUCCINYLCHOLINE CHLORIDE 140 MG: 20 INJECTION, SOLUTION INTRAMUSCULAR; INTRAVENOUS at 11:26

## 2023-01-23 RX ADMIN — DEXAMETHASONE SODIUM PHOSPHATE 8 MG: 4 INJECTION, SOLUTION INTRAMUSCULAR; INTRAVENOUS at 11:36

## 2023-01-23 RX ADMIN — NALOXONE HYDROCHLORIDE 0.1 MG: 0.4 INJECTION, SOLUTION INTRAMUSCULAR; INTRAVENOUS; SUBCUTANEOUS at 12:16

## 2023-01-23 RX ADMIN — FENTANYL CITRATE 100 MCG: 50 INJECTION, SOLUTION INTRAMUSCULAR; INTRAVENOUS at 11:22

## 2023-01-23 RX ADMIN — HYDROCODONE BITARTRATE AND ACETAMINOPHEN 1 TABLET: 5; 325 TABLET ORAL at 14:01

## 2023-01-23 RX ADMIN — LIDOCAINE HYDROCHLORIDE 100 MG: 20 INJECTION, SOLUTION INFILTRATION; PERINEURAL at 11:26

## 2023-01-23 RX ADMIN — ROCURONIUM BROMIDE 30 MG: 10 INJECTION INTRAVENOUS at 11:33

## 2023-01-23 RX ADMIN — ONDANSETRON 4 MG: 2 INJECTION INTRAMUSCULAR; INTRAVENOUS at 11:36

## 2023-01-23 RX ADMIN — DEXMEDETOMIDINE HYDROCHLORIDE 10 MCG: 100 INJECTION, SOLUTION INTRAVENOUS at 11:44

## 2023-01-23 RX ADMIN — SODIUM CHLORIDE, POTASSIUM CHLORIDE, SODIUM LACTATE AND CALCIUM CHLORIDE: 600; 310; 30; 20 INJECTION, SOLUTION INTRAVENOUS at 10:27

## 2023-01-23 RX ADMIN — DEXMEDETOMIDINE HYDROCHLORIDE 10 MCG: 100 INJECTION, SOLUTION INTRAVENOUS at 11:48

## 2023-01-23 ASSESSMENT — PAIN DESCRIPTION - DESCRIPTORS
DESCRIPTORS: ACHING;DISCOMFORT
DESCRIPTORS: SORE
DESCRIPTORS: ACHING
DESCRIPTORS: ACHING;DISCOMFORT

## 2023-01-23 ASSESSMENT — PAIN SCALES - GENERAL
PAINLEVEL_OUTOF10: 8
PAINLEVEL_OUTOF10: 5
PAINLEVEL_OUTOF10: 2

## 2023-01-23 ASSESSMENT — PAIN DESCRIPTION - ORIENTATION
ORIENTATION: MID
ORIENTATION: MID
ORIENTATION: RIGHT;MID
ORIENTATION: MID

## 2023-01-23 ASSESSMENT — PAIN DESCRIPTION - ONSET: ONSET: GRADUAL

## 2023-01-23 ASSESSMENT — PAIN DESCRIPTION - LOCATION
LOCATION: ABDOMEN

## 2023-01-23 ASSESSMENT — PAIN - FUNCTIONAL ASSESSMENT: PAIN_FUNCTIONAL_ASSESSMENT: PREVENTS OR INTERFERES SOME ACTIVE ACTIVITIES AND ADLS

## 2023-01-23 ASSESSMENT — PAIN DESCRIPTION - FREQUENCY: FREQUENCY: CONTINUOUS

## 2023-01-23 NOTE — H&P
[facility]      I have reviewed the history and physical and examined the patient and find no relevant changes. I have reviewed with the patient and/or family the risks, benefits, and alternatives to the procedure.     Victor Hugo Esposito MD  1/23/2023

## 2023-01-23 NOTE — PROGRESS NOTES
Pt arrived to PACU from OR in Stable condition and is RASS -1 (Drowsy) . Respirations are Regular Pattern; RR 8-20 = 16 on 5L O2 per  simple mask . Skin warm and dry. Abd is  soft. Pain: denies at this time. Abdomen surgical site(s) intact with dressing= clean, dry, intact, and nontender. Will continue to monitor for safety and comfort. S/P: OPEN REPAIR OF UMBILICAL HERNIA WITH MESH , with Dr. Kristin Bobby at [facility].

## 2023-01-23 NOTE — PROGRESS NOTES
VSS. Discharge instructions reviewed with pt and mother. Pt with pain at tolerable level. Po pain meds given as ordered. PIV d/c'd and pt dressing for discharge home at this time.

## 2023-01-23 NOTE — ANESTHESIA POSTPROCEDURE EVALUATION
Department of Anesthesiology  Postprocedure Note    Patient: Agustina Goodrich  MRN: [id number]  YOB: 1965  Date of evaluation: 1/23/2023      Procedure Summary     Date: 01/23/23 Room / Location: [address]    Anesthesia Start: 1121 Anesthesia Stop: 1027    Procedure: OPEN REPAIR OF UMBILICAL HERNIA WITH MESH (Abdomen) Diagnosis:       Umbilical hernia without obstruction and without gangrene      (Umbilical hernia without obstruction and without gangrene [K42.9])    Surgeons: Martir Peralta MD Responsible Provider: Mercedes West MD    Anesthesia Type: general ASA Status: 2          Anesthesia Type: No value filed.     Kvng Phase I: Kvng Score: 10    Kvng Phase II:        Anesthesia Post Evaluation    Patient location during evaluation: PACU  Patient participation: complete - patient participated  Level of consciousness: awake and alert  Pain score: 2  Airway patency: patent  Nausea & Vomiting: no vomiting  Complications: no  Cardiovascular status: blood pressure returned to baseline  Respiratory status: acceptable  Hydration status: euvolemic  Multimodal analgesia pain management approach

## 2023-01-23 NOTE — DISCHARGE INSTRUCTIONS
CALL DR. Vanna Selby -[phone] FOR QUESTION OR CONCERNS. GENERAL SURGERY DISCHARGE INSTRUCTIONS    Follow your surgeons instructions. Follow up with your surgeon as directed. Observe the operative area for signs of excessive bleeding. If needed apply pressure,elevate if able and contact your surgeon. Observe the operative site for any signs of infection- such as increased pain,redness,fever greater than 101 degrees,swelling, foul odor or drainage. Contact your surgeon if any of these symptoms are present. Keep operative site clean and dry. Your incision is waterproof, you may shower. Apply ice as directed. If unable to urinate once you are at home,  notify your surgeon or go to the Emergency Room. Avoid pulling,pushing or tugging to suture line. If you become short of breath call your doctor or go to the ER. Take medications as directed. Pain medication should be taken with food. Do not drive or operate machinery while taking narcotics. For any problems or question call your surgeon. ANESTHESIA DISCHARGE INSTRUCTIONS    Wear your seatbelt home. You are under the influence of drugs-do not drink alcohol, drive, operate machinery, make any important decisions or sign any legal documents for 24 hours. Children should not ride bikes, [place] or play on gym sets for 24 hours after surgery. A responsible adult needs to be with you for 24 hours. You may experience lightheadedness, dizziness, or sleepiness following surgery. Rest at home today- increase activity as tolerated. Progress slowly to a regular diet unless your physician has instructed you otherwise. Drink plenty of water. If persistent nausea and vomiting becomes a problem, call your physician. Coughing, sore throat and muscle aches are other side effects of anesthesia, and should improve with time. Do not drive or operate machinery while taking narcotics.   Females of childbearing potential and on hormonal birth control, should use two forms of contraception following procedure if given a medication called sugammadex and/or emend. Additional contraception should be used for 7 days for sugammadex and/or 28 days for emend. These medications have a potential to reduce the effectiveness of hormonal birth control.

## 2023-01-23 NOTE — ANESTHESIA PRE PROCEDURE
Department of Anesthesiology  Preprocedure Note       Name:  Oniel Maurice   Age:  62 y.o.  :  1965                                          MRN:  [id number]         Date:  2023      Surgeon: Leighton Patel):  Brooklynn Serrano MD    Procedure: Procedure(s):  OPEN REPAIR OF UMBILICAL HERNIA WITH MESH    Medications prior to admission:   Prior to Admission medications    Medication Sig Start Date End Date Taking? Authorizing Provider   gabapentin (NEURONTIN) 800 MG tablet Take 1 tablet by mouth 3 times daily for 90 days. 23  Cristóbal Cano MD   lisinopril (PRINIVIL;ZESTRIL) 20 MG tablet Take 1 tablet by mouth daily 23  GERMÁN Murphy CNP   vitamin D3 (CHOLECALCIFEROL) 25 MCG (1000 UT) TABS tablet TAKE 1 TABLET BY MOUTH EVERY DAY 8/3/22   Cristóbal Cano MD   gabapentin (NEURONTIN) 400 MG capsule Take 2 tablets 3 times per day 22  Cristóbal Cano MD       Current medications:    No current facility-administered medications for this encounter. Current Outpatient Medications   Medication Sig Dispense Refill    gabapentin (NEURONTIN) 800 MG tablet Take 1 tablet by mouth 3 times daily for 90 days.  270 tablet 1    lisinopril (PRINIVIL;ZESTRIL) 20 MG tablet Take 1 tablet by mouth daily 90 tablet 1    vitamin D3 (CHOLECALCIFEROL) 25 MCG (1000 UT) TABS tablet TAKE 1 TABLET BY MOUTH EVERY DAY 30 tablet 5    gabapentin (NEURONTIN) 400 MG capsule Take 2 tablets 3 times per day 180 capsule 5       Allergies:  No Known Allergies    Problem List:    Patient Active Problem List   Diagnosis Code    Hypertension I10    Hepatic disease K76.9    Hyperlipidemia E78.5    Neuropathy, peripheral G62.9    Alcohol abuse F10.10    Dupuytren contracture M72.0    Smoking F17.200    Arnel's nodes (with arthropathy) M15.2    Arthritis M19.90    Idiopathic gout M10.00       Past Medical History:        Diagnosis Date    Alcohol abuse     Gout     Hepatic disease  Hyperlipidemia     Hypertension     Neuropathy, peripheral        Past Surgical History:        Procedure Laterality Date    MOUTH SURGERY         Social History:    Social History     Tobacco Use    Smoking status: Every Day     Packs/day: 2.00     Years: 10.00     Pack years: 20.00     Types: Cigarettes    Smokeless tobacco: Never   Substance Use Topics    Alcohol use: Yes     Alcohol/week: 12.0 standard drinks     Types: 12 Cans of beer per week     Comment: daily - 12 per day  and 1L jagger per day                                Ready to quit: Not Answered  Counseling given: Not Answered      Vital Signs (Current): There were no vitals filed for this visit.                                            BP Readings from Last 3 Encounters:   01/10/23 137/70   01/09/23 (!) 144/98   11/02/22 133/86       NPO Status:                                                                                 BMI:   Wt Readings from Last 3 Encounters:   01/10/23 216 lb (98 kg)   01/09/23 215 lb 12.8 oz (97.9 kg)   10/12/22 219 lb (99.3 kg)     There is no height or weight on file to calculate BMI.    CBC:   Lab Results   Component Value Date/Time    WBC 6.4 01/17/2023 03:23 PM    RBC 4.16 01/17/2023 03:23 PM    HGB 15.5 01/17/2023 03:23 PM    HCT 46.2 01/17/2023 03:23 PM    .9 01/17/2023 03:23 PM    RDW 16.6 01/17/2023 03:23 PM     01/17/2023 03:23 PM       CMP:   Lab Results   Component Value Date/Time     01/17/2023 03:23 PM    K 4.4 01/17/2023 03:23 PM    K 3.4 03/21/2021 03:43 AM     01/17/2023 03:23 PM    CO2 33 01/17/2023 03:23 PM    BUN 9 01/17/2023 03:23 PM    CREATININE 0.9 01/17/2023 03:23 PM    GFRAA >60 03/26/2021 01:28 PM    GFRAA >60 03/28/2012 11:16 PM    AGRATIO 1.6 01/17/2023 03:23 PM    LABGLOM >60 01/17/2023 03:23 PM    GLUCOSE 95 01/17/2023 03:23 PM    PROT 7.2 01/17/2023 03:23 PM    PROT 6.4 03/20/2012 02:20 PM    CALCIUM 9.2 01/17/2023 03:23 PM    BILITOT 0.5 01/17/2023 03:23 PM    ALKPHOS 159 01/17/2023 03:23 PM    AST 38 01/17/2023 03:23 PM    ALT 21 01/17/2023 03:23 PM       POC Tests: No results for input(s): POCGLU, POCNA, POCK, POCCL, POCBUN, POCHEMO, POCHCT in the last 72 hours. Coags: No results found for: PROTIME, INR, APTT    HCG (If Applicable): No results found for: PREGTESTUR, PREGSERUM, HCG, HCGQUANT     ABGs: No results found for: PHART, PO2ART, AJD8HHJ, KIT9AMG, BEART, T3KJPQXZ     Type & Screen (If Applicable):  No results found for: LABABO, LABRH    Drug/Infectious Status (If Applicable):  No results found for: HIV, HEPCAB    COVID-19 Screening (If Applicable):   Lab Results   Component Value Date/Time    COVID19 Not Detected 01/05/2022 09:59 PM           Anesthesia Evaluation  Patient summary reviewed and Nursing notes reviewed  Airway: Mallampati: II  TM distance: >3 FB   Neck ROM: full  Mouth opening: > = 3 FB   Dental:          Pulmonary:                              Cardiovascular:  Exercise tolerance: good (>4 METS),   (+) hypertension: moderate,                   Neuro/Psych:   (+) neuromuscular disease (PERIPHERAL NEUROPATHY):,             GI/Hepatic/Renal:   (+) liver disease:,          ROS comment: ETOH abuse. Endo/Other:    (+) : arthritis: OA., .                 Abdominal:             Vascular: Other Findings:           Anesthesia Plan      general     ASA 2       Induction: intravenous and rapid sequence. MIPS: Postoperative opioids intended, Prophylactic antiemetics administered and Postoperative trial extubation. Anesthetic plan and risks discussed with patient. Plan discussed with CRNA.           Post-op pain plan if not by surgeon: single peripheral nerve block            Carole Gutiérrez MD   1/23/2023

## 2023-01-23 NOTE — BRIEF OP NOTE
Brief Postoperative Note      Patient: Donavon Argueta  YOB: 1965  MRN: [id number]    Date of Procedure: 1/23/2023    Pre-Op Diagnosis: Umbilical hernia without obstruction and without gangrene [K42.9]    Post-Op Diagnosis: Same       Procedure(s):  OPEN REPAIR OF UMBILICAL HERNIA WITH MESH    Surgeon(s):  Carmelina Schmidt MD    Assistant:  Surgical Assistant: Vernell Brown    Anesthesia: General    Estimated Blood Loss (mL): Minimal    Complications: None    Specimens:   * No specimens in log *    Implants:  Implant Name Type Inv.  Item Serial No.  Lot No. LRB No. Used Action   PATCH LENNY SM DIA3IN UNCOATED MFIL PROPYLENE CIR ABSRB FOREMAN - [id number]  PATCH LENNY SM DIA3IN UNCOATED MFIL PROPYLENE CIR ABSRB [facility] DAVOL- [id number] N/A 1 Implanted         Drains: * No LDAs found *    Findings: Incarcerated 2.5 cm umbilical hernia repaired    Electronically signed by Carmelina Schmidt MD on 1/23/2023 at 12:18 PM

## 2023-01-23 NOTE — ANESTHESIA POSTPROCEDURE EVALUATION
Department of Anesthesiology  Postprocedure Note    Patient: Brooks Savage  MRN: [id number]  YOB: 1965  Date of evaluation: 1/23/2023      Procedure Summary     Date: 01/23/23 Room / Location: [address]    Anesthesia Start: 1121 Anesthesia Stop:     Procedure: OPEN REPAIR OF UMBILICAL HERNIA WITH MESH (Abdomen) Diagnosis:       Umbilical hernia without obstruction and without gangrene      (Umbilical hernia without obstruction and without gangrene [K42.9])    Surgeons: Marin Sandoval MD Responsible Provider: Dewey Dooley MD    Anesthesia Type: general ASA Status: 2          Anesthesia Type: No value filed.     Kvng Phase I: Kvng Score: 10    Kvng Phase II:        Anesthesia Post Evaluation    Patient location during evaluation: PACU  Patient participation: complete - patient participated  Level of consciousness: awake and alert  Pain score: 2  Airway patency: patent  Nausea & Vomiting: no vomiting  Complications: no  Cardiovascular status: blood pressure returned to baseline  Respiratory status: acceptable  Hydration status: euvolemic  Multimodal analgesia pain management approach

## 2023-01-24 NOTE — OP NOTE
[facility] [place] [facility]                     [address] [facility], [address] [place] [address]                                OPERATIVE REPORT    PATIENT NAME: Suzy Gottron                   :        1965  MED REC NO:   [id number]                          ROOM:  ACCOUNT NO:   [de-identified]                           ADMIT DATE: 2023  PROVIDER:     Adia Good MD    DATE OF PROCEDURE:  2023    PREOPERATIVE DIAGNOSIS:  Incarcerated umbilical hernia, 2.5 cm size. POSTOPERATIVE DIAGNOSIS:  Incarcerated umbilical hernia, 2.5 cm size. PROCEDURE:  Open repair of umbilical hernia, 2.5 cm with retro fascial  Ventrio ST 7.6 cm mesh. SURGEON:  Adia Good MD    ANESTHESIA:  General.    ESTIMATED BLOOD LOSS:  Minimal.    COMPLICATIONS:  None. INDICATIONS:  The patient presents with tender enlarging ventral hernia,  for repair today. ADDITIONAL DETAILS OF SURGERY:  The patient was brought to the operating  room, placed on the operative table in supine position. Compression  boots were placed. General anesthetic was administered. The abdomen  was prepped and draped sterilely and time-out was done. A  supraumbilical incision made with a 15-blade scalpel. Dissection was  carried down through the level of abdominal fascia. Circumferentially  umbilical dermis was dissected. The hernia sac was dissected. The  contents were elevated. There was incarcerated omental fat and  preperitoneal fat and hernia which was excised and discarded. The  margins were cleared. The hernia was 2.5 cm in size. Repair was then  performed with a retrofascial Ventrio ST 7.6 cm mesh. This was sutured  in all four quadrants down through the fascia through the mesh cuffs and  back up through the fascia again. These were all tied securely. The  hernia repair appeared strong and intact. The fascia was then closed  primarily overlying the mesh with interrupted 0-Ethibond suture. Subcutaneous tissue with umbilical dermis was tacked back down the  fascia with 3-0 Vicryl suture. Local anesthetic was placed liberally  throughout the entire wound field and skin was closed with 4-0 Monocryl  suture. Dermabond was placed. The patient was taken to recovery room  in stable condition postop. Jaison Menon MD    D: 01/23/2023 12:33:12       T: 01/23/2023 12:38:53     [email or web address]  Job#: [id number]     Doc#: [id number]    CC:   Steph Mauricio MD

## 2023-01-31 DIAGNOSIS — G60.9 IDIOPATHIC PERIPHERAL NEUROPATHY: ICD-10-CM

## 2023-02-01 RX ORDER — GABAPENTIN 400 MG/1
CAPSULE ORAL
Qty: 180 CAPSULE | Refills: 5 | Status: SHIPPED | OUTPATIENT
Start: 2023-02-01 | End: 2023-08-01

## 2023-02-07 ENCOUNTER — TELEPHONE (OUTPATIENT)
Dept: ORTHOPEDIC SURGERY | Age: 58
End: 2023-02-07

## 2023-02-07 ENCOUNTER — OFFICE VISIT (OUTPATIENT)
Dept: SURGERY | Age: 58
End: 2023-02-07

## 2023-02-07 VITALS — WEIGHT: 212 LBS | BODY MASS INDEX: 26.5 KG/M2 | DIASTOLIC BLOOD PRESSURE: 69 MMHG | SYSTOLIC BLOOD PRESSURE: 130 MMHG

## 2023-02-07 DIAGNOSIS — K42.9 UMBILICAL HERNIA WITHOUT OBSTRUCTION AND WITHOUT GANGRENE: Primary | ICD-10-CM

## 2023-02-07 PROCEDURE — 99024 POSTOP FOLLOW-UP VISIT: CPT | Performed by: SURGERY

## 2023-02-07 NOTE — LETTER
[facility] [place] [facility]  [address]  [address]  [place] [id number]  Phone: [phone]  Fax: [phone]    Patrick Osorio MD    February 7, 2023     [address]  [facility] [address]    Patient: Sammy Kirby   MR Number: [id number]   YOB: 1965   Date of Visit: 2/7/2023       Dear Frank Wade:    Thank you for referring Sammy Kirby to me for evaluation/treatment. Below are the relevant portions of my assessment and plan of care.          If you have questions, please do not hesitate to call me. I look forward to following Sammy along with you.    Sincerely,      Patrick Osorio MD

## 2023-02-07 NOTE — PROGRESS NOTES
[facility]              PATIENT NAME: Urmila Marshall     TODAY'S DATE: 2/7/2023    SUBJECTIVE:      Pt. returns to the office today following an umbilical hernia repair with mesh. He had surgery on 1/23/23 at [facility]. He has been recovering well to date, with progression towards normal activity and diet. He has no complaints today. OBJECTIVE:   VITALS:  Wt 212 lb (96.2 kg)   BMI 26.50 kg/m²                                  CONSTITUTIONAL:  awake and alert  LUNGS:  clear to auscultation  ABDOMEN:   Hernia repair is intact, normal bowel sounds, soft, non-distended, non-tender   INCISION: clean, dry, no drainage        ASSESSMENT AND PLAN:  62 y.o. male status post umbilical hernia repair. His recovery is progressing uneventfully, and he is released to progressive normal activity. He will call or return for any additional problems or questions.   RTW set for 2/20/23      Romero Flores MD

## 2023-02-07 NOTE — TELEPHONE ENCOUNTER
Pt states he has been waiting to hear about getting an injection or surgery.  Pls call [phone] (home) [phone] (work)

## 2023-02-08 NOTE — TELEPHONE ENCOUNTER
Pt Xiaflex medication has . Last seen in 2019 and never called us back to schedule after multiple attempts. Pt will have to sign [employer] form and be resubmitted for coverage and new dose of medication.

## 2023-02-08 NOTE — TELEPHONE ENCOUNTER
Spoke with pt regarding Xiaflex medication and needing to sign a consent for us to obtain insurance coverage. Pt stated he has the same insurance, [employer], and will stop up at [facility]  this afternoon to sign form. Explained to pt that this takes some time for us to receive results and we would call him once it is completed. Also informed [facility] would be reaching out to him for coverage information and approval of medication shipment.

## 2023-03-22 ENCOUNTER — TELEPHONE (OUTPATIENT)
Dept: ORTHOPEDIC SURGERY | Age: 58
End: 2023-03-22

## 2023-03-22 NOTE — TELEPHONE ENCOUNTER
[facility] called and said the Diaflex needs a prior authorization.  Info on cover my meds & faxed to [facility]

## 2023-03-28 NOTE — TELEPHONE ENCOUNTER
Patient call back and he just was ck the statues of his refill that the pharmacy call about he just need  a call back  and if he don't answered please leave him VM. Please Advise.

## 2023-03-31 ENCOUNTER — TELEPHONE (OUTPATIENT)
Dept: ORTHOPEDIC SURGERY | Age: 58
End: 2023-03-31

## 2023-03-31 NOTE — TELEPHONE ENCOUNTER
Liliana Schulz, [address] [place] [address], [phone]. Please call to confirm delivery for Xiaflex.

## 2023-04-05 NOTE — TELEPHONE ENCOUNTER
General Question     Subject: REQUESTING A CALL FROM CRISTAL Almaguer.   Patient: Shayna Douglass  Contact Number: [phone]

## 2023-04-06 NOTE — TELEPHONE ENCOUNTER
PATIENT IS RETURNING CALL REGARDING Andrews Portillo. PATIENT IS REQ TO SPEAK TO SOMEONE ABOUT THIS.     PLEASE CONTACT PATIENT AT [address] # [phone]

## 2023-04-06 NOTE — TELEPHONE ENCOUNTER
Spoke with the patient. I let him know that Sandra Baez had called him to tell him that we have to figure out a date to do the injections and then we will get him scheduled. Patient understood and requests that we call his cell number [phone].

## 2023-05-05 ENCOUNTER — OFFICE VISIT (OUTPATIENT)
Dept: ORTHOPEDIC SURGERY | Age: 58
End: 2023-05-05

## 2023-05-05 VITALS — HEIGHT: 75 IN | WEIGHT: 212 LBS | RESPIRATION RATE: 16 BRPM | BODY MASS INDEX: 26.36 KG/M2

## 2023-05-05 DIAGNOSIS — M72.0 DUPUYTREN'S CONTRACTURE: Primary | ICD-10-CM

## 2023-05-06 NOTE — PATIENT INSTRUCTIONS
Thank you for choosing [facility]) Physicians for your Hand and Upper Extremity needs. If we can be of any further assistance to you, please do not hesitate to contact us.     Office Phone Number:  [phone]  or  [phone]

## 2023-05-06 NOTE — PROGRESS NOTES
Mr. Ellouise Fothergill  returns today regarding right Small Finger Dupuytren's Contracture for which he was last evaluated in October 2022. Treatment up to this time has included: Xiaflex Injection in the contralateral hand . He has noted the size of the palmar thickening has been increasing with time. He has noted any limitation of motion of the Small Finger; He does report any discomfort associated with his presenting concern. Due to the dynamic and progressive nature of Dupuytren's Contracture, It is clinically necessary to carefully re-evaluate Mr. Ellouise Fothergill today, prior to proceeding with any further treatment or intervention, to assure the clinical appropriateness of any previously considered treatment, at this time. I have today reviewed with Ellouise Fothergill the clinically relevant, past medical history, medications, allergies,  family history, social history, and Review Of Systems & I have documented any details relevant to today's presenting complaints in my history above. Mr. Monie Santizo self-reported past medical history, medications, allergies,  family history, social history, and Review Of Systems have been scanned into the chart under the \"Media\" tab. Physical Exam:  Vitals  Respirations: 16  Height: 6' 3\" (190.5 cm)  Weight - Scale: 212 lb (96.2 kg)  Mr. Ellouise Fothergill appears well, he is in no apparent distress, he demonstrates appropriate mood & affect. Skin: Normal Color, Free of Lesions, and Clinical evidence of Dupuytren's Contracture Bilateral with Knuckle Pads bilaterally   Thickening with cord formation is noted in the palm along the axis of the right Small Finger. There is  extension into the digit itself. There is a flexion contracture of the right Small Finger MP joint of 60 degrees & of the right Small Finger PIP joint of 75 degrees.   All other digital range of motion is unchanged from prior examination bilaterally  Wrist range of motion is without

## 2023-05-08 ENCOUNTER — OFFICE VISIT (OUTPATIENT)
Dept: ORTHOPEDIC SURGERY | Age: 58
End: 2023-05-08

## 2023-05-08 DIAGNOSIS — M72.0 DUPUYTREN'S CONTRACTURE: Primary | ICD-10-CM

## 2023-05-08 NOTE — PROGRESS NOTES
applied to the right Small Finger to assist in maintaining the correction of the deformity. Mr. Manolo Rudolph was given instructions regarding care of the hand, work of edema control and gentle range of motion. A prescription for formal hand therapy was offered and declined by the patient. He is instructed in the judicious use of over-the-counter anti-inflammatory medications or pain relievers for his symptoms if allowed by his primary care physician. I have asked Mr. Manolo Rudolph to schedule a follow-up appointment for 2 weeks from now for re-evaluation of the hand. Mr. Manolo Rudolph has been given a full verbal list of instructions and precautions related to his present condition. I have asked him to followup with me in the office at the prescribed time. He is also specifically requested to call or return to the office sooner if his symptoms change or worsen prior to the next scheduled appointment.

## 2023-05-08 NOTE — PATIENT INSTRUCTIONS
Hand Range of Motion Instructions      Dr. Jaiden Bates    Be cautions in resuming fulll activities and use of the hand for next 2 - 4 weeks. Perform the following exercises VIGOROUSLY at least four times a day. Exercises should be performed in the seated position with elbow on tabletop or other firm surface. If you cannot make these motions on your own, you may use other hand to assist in making these motions. Fully straighten fingers until hand is flat. Fully bend fingers until hand is in a full fist.   Bend wrist forward and backward (grasp hand around knuckles with other hand to do so). Rotate forearm so that your palm faces towards your face. Rotate forearm so that your palm faces away from your face (grasp hand around wrist with other hand to do so). Fully straighten elbow. Fully bend elbow. Continue light use of the hand progressing to more normal us as it feels comfortable to do so. In 2 - 4 weeks you may discontinue using the brace (if you were using one) and resume normal use of the hand and wrist if you have regained full and painless motion and function. If you are unable to achieve  full and painless motion and function over 4 weeks, please call the office at [phone] to schedule a follow-up appointment with Dr. Narendra Logan. Thank you for choosing [facility]) Physicians for your Hand and Upper Extremity needs. If we can be of any further assistance to you, please do not hesitate to contact us.     Office Phone Number:  [phone]  or  [phone]

## 2023-05-23 DIAGNOSIS — I10 ESSENTIAL HYPERTENSION: ICD-10-CM

## 2023-05-24 RX ORDER — LISINOPRIL 20 MG/1
TABLET ORAL
Qty: 90 TABLET | Refills: 0 | Status: SHIPPED | OUTPATIENT
Start: 2023-05-24

## 2023-06-28 ENCOUNTER — APPOINTMENT (OUTPATIENT)
Dept: GENERAL RADIOLOGY | Age: 58
End: 2023-06-28
Payer: COMMERCIAL

## 2023-06-28 ENCOUNTER — HOSPITAL ENCOUNTER (EMERGENCY)
Age: 58
Discharge: HOME | End: 2023-06-28
Payer: COMMERCIAL

## 2023-06-28 VITALS
HEART RATE: 68 BPM | RESPIRATION RATE: 16 BRPM | OXYGEN SATURATION: 94 % | WEIGHT: 215 LBS | DIASTOLIC BLOOD PRESSURE: 101 MMHG | BODY MASS INDEX: 26.73 KG/M2 | SYSTOLIC BLOOD PRESSURE: 158 MMHG | TEMPERATURE: 98 F | HEIGHT: 75 IN

## 2023-06-28 DIAGNOSIS — G60.9 IDIOPATHIC PERIPHERAL NEUROPATHY: ICD-10-CM

## 2023-06-28 DIAGNOSIS — S39.012A STRAIN OF LUMBAR REGION, INITIAL ENCOUNTER: Primary | ICD-10-CM

## 2023-06-28 PROCEDURE — 99284 EMERGENCY DEPT VISIT MOD MDM: CPT

## 2023-06-28 PROCEDURE — 6360000002 HC RX W HCPCS: Performed by: PHYSICIAN ASSISTANT

## 2023-06-28 PROCEDURE — 96372 THER/PROPH/DIAG INJ SC/IM: CPT

## 2023-06-28 PROCEDURE — 6370000000 HC RX 637 (ALT 250 FOR IP): Performed by: PHYSICIAN ASSISTANT

## 2023-06-28 PROCEDURE — 72110 X-RAY EXAM L-2 SPINE 4/>VWS: CPT

## 2023-06-28 RX ORDER — PREDNISONE 20 MG/1
60 TABLET ORAL ONCE
Status: COMPLETED | OUTPATIENT
Start: 2023-06-28 | End: 2023-06-28

## 2023-06-28 RX ORDER — NAPROXEN 500 MG/1
500 TABLET ORAL 2 TIMES DAILY WITH MEALS
Qty: 60 TABLET | Refills: 0 | Status: SHIPPED | OUTPATIENT
Start: 2023-06-28

## 2023-06-28 RX ORDER — CYCLOBENZAPRINE HCL 10 MG
10 TABLET ORAL ONCE
Status: COMPLETED | OUTPATIENT
Start: 2023-06-28 | End: 2023-06-28

## 2023-06-28 RX ORDER — KETOROLAC TROMETHAMINE 30 MG/ML
60 INJECTION, SOLUTION INTRAMUSCULAR; INTRAVENOUS ONCE
Status: COMPLETED | OUTPATIENT
Start: 2023-06-28 | End: 2023-06-28

## 2023-06-28 RX ORDER — PREDNISONE 10 MG/1
60 TABLET ORAL DAILY
Qty: 30 TABLET | Refills: 0 | Status: SHIPPED | OUTPATIENT
Start: 2023-06-28 | End: 2023-07-03

## 2023-06-28 RX ORDER — CYCLOBENZAPRINE HCL 10 MG
10 TABLET ORAL 3 TIMES DAILY PRN
Qty: 20 TABLET | Refills: 0 | Status: SHIPPED | OUTPATIENT
Start: 2023-06-28 | End: 2023-07-08

## 2023-06-28 RX ADMIN — PREDNISONE 60 MG: 20 TABLET ORAL at 13:16

## 2023-06-28 RX ADMIN — KETOROLAC TROMETHAMINE 60 MG: 60 INJECTION, SOLUTION INTRAMUSCULAR at 13:17

## 2023-06-28 RX ADMIN — CYCLOBENZAPRINE 10 MG: 10 TABLET, FILM COATED ORAL at 13:16

## 2023-06-28 ASSESSMENT — ENCOUNTER SYMPTOMS
DIARRHEA: 0
NAUSEA: 0
SHORTNESS OF BREATH: 0
COUGH: 0
BACK PAIN: 1
ABDOMINAL PAIN: 0
VOMITING: 0
CHEST TIGHTNESS: 0

## 2023-06-28 ASSESSMENT — PAIN - FUNCTIONAL ASSESSMENT
PAIN_FUNCTIONAL_ASSESSMENT: 0-10
PAIN_FUNCTIONAL_ASSESSMENT: PREVENTS OR INTERFERES SOME ACTIVE ACTIVITIES AND ADLS
PAIN_FUNCTIONAL_ASSESSMENT: 0-10

## 2023-06-28 ASSESSMENT — PAIN DESCRIPTION - LOCATION
LOCATION: BACK

## 2023-06-28 ASSESSMENT — PAIN DESCRIPTION - DESCRIPTORS: DESCRIPTORS: ACHING

## 2023-06-28 ASSESSMENT — PAIN SCALES - GENERAL
PAINLEVEL_OUTOF10: 9
PAINLEVEL_OUTOF10: 9
PAINLEVEL_OUTOF10: 8

## 2023-06-28 ASSESSMENT — PAIN DESCRIPTION - PAIN TYPE: TYPE: ACUTE PAIN

## 2023-06-28 ASSESSMENT — PAIN DESCRIPTION - ORIENTATION: ORIENTATION: LOWER;MID

## 2023-06-28 ASSESSMENT — PAIN DESCRIPTION - FREQUENCY: FREQUENCY: CONTINUOUS

## 2023-06-29 RX ORDER — GABAPENTIN 800 MG/1
800 TABLET ORAL 3 TIMES DAILY
Qty: 90 TABLET | Refills: 2 | Status: SHIPPED | OUTPATIENT
Start: 2023-06-29 | End: 2023-09-27

## 2023-07-11 ENCOUNTER — TELEPHONE (OUTPATIENT)
Dept: ENT CLINIC | Age: 58
End: 2023-07-11

## 2023-07-11 NOTE — TELEPHONE ENCOUNTER
Called patient to see if he would like to come in and be seen and potentially schedule surgery again. - No answer - Left Message

## 2023-07-30 DIAGNOSIS — G60.9 IDIOPATHIC PERIPHERAL NEUROPATHY: ICD-10-CM

## 2023-07-30 DIAGNOSIS — I10 ESSENTIAL HYPERTENSION: ICD-10-CM

## 2023-07-31 RX ORDER — LISINOPRIL 20 MG/1
TABLET ORAL
Qty: 90 TABLET | Refills: 1 | Status: SHIPPED | OUTPATIENT
Start: 2023-07-31

## 2023-07-31 RX ORDER — GABAPENTIN 400 MG/1
CAPSULE ORAL
Qty: 180 CAPSULE | Refills: 5 | Status: SHIPPED | OUTPATIENT
Start: 2023-07-31 | End: 2024-01-30

## 2023-07-31 NOTE — TELEPHONE ENCOUNTER
Medication:   Requested Prescriptions     Pending Prescriptions Disp Refills    lisinopril (PRINIVIL;ZESTRIL) 20 MG tablet [Pharmacy Med Name: LISINOPRIL 20 MG TABLET] 90 tablet 0     Sig: TAKE 1 TABLET BY MOUTH EVERY DAY       Last Filled:      Patient Phone Number: [phone] (home)     Last appt: 4/11/2023   Next appt: 7/30/2023    Lab Results   Component Value Date     01/23/2023    K 4.3 01/23/2023    CL 98 (L) 01/23/2023    CO2 31 01/23/2023    BUN 13 01/23/2023    CREATININE 1.0 01/23/2023    GLUCOSE 137 (H) 01/23/2023    CALCIUM 9.1 01/23/2023    PROT 7.7 01/23/2023    LABALBU 4.8 01/23/2023    BILITOT 0.9 01/23/2023    ALKPHOS 123 01/23/2023    AST 34 01/23/2023    ALT 26 01/23/2023    LABGLOM >60 01/23/2023    GFRAA >60 03/26/2021    AGRATIO 1.7 01/23/2023    GLOB 2.3 03/26/2021

## 2023-10-25 ENCOUNTER — OFFICE VISIT (OUTPATIENT)
Dept: FAMILY MEDICINE CLINIC | Age: 58
End: 2023-10-25
Payer: COMMERCIAL

## 2023-10-25 VITALS
HEIGHT: 75 IN | SYSTOLIC BLOOD PRESSURE: 155 MMHG | WEIGHT: 210 LBS | DIASTOLIC BLOOD PRESSURE: 95 MMHG | TEMPERATURE: 97.3 F | OXYGEN SATURATION: 97 % | BODY MASS INDEX: 26.11 KG/M2 | HEART RATE: 85 BPM | RESPIRATION RATE: 16 BRPM

## 2023-10-25 DIAGNOSIS — Z09 HOSPITAL DISCHARGE FOLLOW-UP: ICD-10-CM

## 2023-10-25 DIAGNOSIS — R05.1 ACUTE COUGH: ICD-10-CM

## 2023-10-25 DIAGNOSIS — K70.30 ALCOHOLIC CIRRHOSIS OF LIVER WITHOUT ASCITES (HCC): ICD-10-CM

## 2023-10-25 DIAGNOSIS — D75.89 MACROCYTOSIS: ICD-10-CM

## 2023-10-25 DIAGNOSIS — J40 BRONCHITIS: ICD-10-CM

## 2023-10-25 DIAGNOSIS — R42 DIZZINESS: ICD-10-CM

## 2023-10-25 DIAGNOSIS — I10 HYPERTENSION, ESSENTIAL: ICD-10-CM

## 2023-10-25 DIAGNOSIS — R42 DIZZINESS: Primary | ICD-10-CM

## 2023-10-25 LAB
ALBUMIN SERPL-MCNC: 4.5 G/DL (ref 3.4–5)
ALP SERPL-CCNC: 149 U/L (ref 40–129)
ALT SERPL-CCNC: 20 U/L (ref 10–40)
AST SERPL-CCNC: 43 U/L (ref 15–37)
BILIRUB DIRECT SERPL-MCNC: <0.2 MG/DL (ref 0–0.3)
BILIRUB INDIRECT SERPL-MCNC: ABNORMAL MG/DL (ref 0–1)
BILIRUB SERPL-MCNC: 0.8 MG/DL (ref 0–1)
FOLATE SERPL-MCNC: <2 NG/ML (ref 4.78–24.2)
PROT SERPL-MCNC: 7.2 G/DL (ref 6.4–8.2)
TROPONIN, HIGH SENSITIVITY: 21 NG/L (ref 0–22)
VIT B12 SERPL-MCNC: 418 PG/ML (ref 211–911)

## 2023-10-25 PROCEDURE — G8419 CALC BMI OUT NRM PARAM NOF/U: HCPCS | Performed by: FAMILY MEDICINE

## 2023-10-25 PROCEDURE — G8484 FLU IMMUNIZE NO ADMIN: HCPCS | Performed by: FAMILY MEDICINE

## 2023-10-25 PROCEDURE — 4004F PT TOBACCO SCREEN RCVD TLK: CPT | Performed by: FAMILY MEDICINE

## 2023-10-25 PROCEDURE — 3017F COLORECTAL CA SCREEN DOC REV: CPT | Performed by: FAMILY MEDICINE

## 2023-10-25 PROCEDURE — 3077F SYST BP >= 140 MM HG: CPT | Performed by: FAMILY MEDICINE

## 2023-10-25 PROCEDURE — 99214 OFFICE O/P EST MOD 30 MIN: CPT | Performed by: FAMILY MEDICINE

## 2023-10-25 PROCEDURE — 1111F DSCHRG MED/CURRENT MED MERGE: CPT | Performed by: FAMILY MEDICINE

## 2023-10-25 PROCEDURE — G8427 DOCREV CUR MEDS BY ELIG CLIN: HCPCS | Performed by: FAMILY MEDICINE

## 2023-10-25 PROCEDURE — 3080F DIAST BP >= 90 MM HG: CPT | Performed by: FAMILY MEDICINE

## 2023-10-25 NOTE — PROGRESS NOTES
[address] [facility] Note    Date: 10/25/2023                                               Primitivo Barragan:     Chief Complaint   Patient presents with    Follow-Up from Hospital     Mild Heart attack        HPI  Patient is here for ED follow-up. Was seen in [facility] emergency department yesterday with complaints of lightheadedness and shortness of breath starting earlier in the day. Symptoms started around 3 PM.  Shortness of breath was of sudden onset. Every time he tried to stand up after this, he became extremely lightheaded and dizzy. He denied fever, chills, chest pain, palpitations, leg swelling, abdominal pain, back pain, syncope, nausea, vomiting, diarrhea. Blood tests were notable for an MCV of 106.4, RBC of 3.90. CT angiogram pulmonary with contrast showed no acute pulmonary embolus, mucous plugging of the left lower lobe, and cirrhotic liver morphology. EKG, as read by the attending, was unremarkable for acute ischemic changes. Initial troponin was 14, patient was advised to wait for 2-hour troponin, but he stated he needed to get back to work, and left. He was prescribed Augmentin. Patient was informed of the cirrhotic liver morphology, likely due to chronic alcohol use. Since going home, pt feels better. Denies anymore SOB or dizziness. No HA or N/V. Continue to have a mild cough that started 3-4 days ago. Has been taking Augmentin. No fever. No wheeze.              Patient Active Problem List    Diagnosis Date Noted    Incarcerated umbilical hernia 97/28/4286    Arnel's nodes (with arthropathy) 12/09/2020    Arthritis 12/09/2020    Idiopathic gout 12/09/2020    Dupuytren contracture 01/19/2016    Smoking 01/19/2016    Hypertension     Hepatic disease     Hyperlipidemia     Neuropathy, peripheral     Alcohol abuse        Past Medical History:   Diagnosis Date    Alcohol abuse     Gout     Hepatic disease     Hyperlipidemia     Hypertension     Neuropathy, peripheral

## 2023-10-26 DIAGNOSIS — E53.8 FOLATE DEFICIENCY: Primary | ICD-10-CM

## 2023-10-26 RX ORDER — FOLIC ACID 1 MG/1
1 TABLET ORAL DAILY
Qty: 90 TABLET | Refills: 1 | Status: SHIPPED | OUTPATIENT
Start: 2023-10-26

## 2023-11-04 DIAGNOSIS — G60.9 IDIOPATHIC PERIPHERAL NEUROPATHY: ICD-10-CM

## 2023-11-06 RX ORDER — GABAPENTIN 800 MG/1
800 TABLET ORAL 3 TIMES DAILY
Qty: 90 TABLET | Refills: 2 | Status: SHIPPED | OUTPATIENT
Start: 2023-11-06 | End: 2024-02-04

## 2023-11-06 NOTE — TELEPHONE ENCOUNTER
Medication:   Requested Prescriptions     Pending Prescriptions Disp Refills    gabapentin (NEURONTIN) 800 MG tablet [Pharmacy Med Name: GABAPENTIN 800 MG TABLET] 90 tablet 2     Sig: Take 1 tablet by mouth 3 times daily for 90 days.         Last Filled:  7/31/2023    Patient Phone Number: [phone] (home)     Last appt: 10/25/2023   Next appt: Visit date not found    Last OARRS:        No data to display

## 2024-02-13 DIAGNOSIS — G60.9 IDIOPATHIC PERIPHERAL NEUROPATHY: ICD-10-CM

## 2024-02-13 RX ORDER — GABAPENTIN 800 MG/1
800 TABLET ORAL 3 TIMES DAILY
Qty: 90 TABLET | Refills: 2 | Status: SHIPPED | OUTPATIENT
Start: 2024-02-13 | End: 2024-05-13

## 2024-02-13 NOTE — TELEPHONE ENCOUNTER
Medication:   Requested Prescriptions     Pending Prescriptions Disp Refills    gabapentin (NEURONTIN) 800 MG tablet [Pharmacy Med Name: GABAPENTIN 800 MG TABLET] 90 tablet 2     Sig: Take 1 tablet by mouth 3 times daily for 90 days.        Last Filled:  11/6/2023    Patient Phone Number: [phone] (home)     Last appt: 10/25/2023   Next appt: Visit date not found    Last OARRS:        No data to display

## 2024-03-09 DIAGNOSIS — G60.9 IDIOPATHIC PERIPHERAL NEUROPATHY: ICD-10-CM

## 2024-03-11 RX ORDER — GABAPENTIN 400 MG/1
CAPSULE ORAL
Qty: 165 CAPSULE | Refills: 2 | Status: SHIPPED | OUTPATIENT
Start: 2024-03-11 | End: 2024-06-11

## 2024-03-11 RX ORDER — GABAPENTIN 400 MG/1
CAPSULE ORAL
Qty: 15 CAPSULE | Refills: 23 | OUTPATIENT
Start: 2024-03-11

## 2024-03-11 NOTE — TELEPHONE ENCOUNTER
Medication:   Requested Prescriptions     Pending Prescriptions Disp Refills    gabapentin (NEURONTIN) 400 MG capsule [Pharmacy Med Name: GABAPENTIN 400 MG CAPSULE] 165 capsule 2     Sig: TAKE 2 CAPSULES BY MOUTH 3 TIMES A DAY        Last Filled:  7/31/2023    Patient Phone Number: [phone] (home)     Last appt: 10/25/2023   Next appt: 3/9/2024    Last OARRS:        No data to display

## 2024-03-11 NOTE — TELEPHONE ENCOUNTER
Medication:   Requested Prescriptions     Pending Prescriptions Disp Refills    gabapentin (NEURONTIN) 400 MG capsule [Pharmacy Med Name: GABAPENTIN 400 MG CAPSULE] 15 capsule 23     Sig: TAKE 2 CAPSULES BY MOUTH 3 TIMES A DAY        Last Filled:    7/31/2023  Patient Phone Number: [phone] (home)     Last appt: 10/25/2023   Next appt: Visit date not found    Last OARRS:        No data to display

## 2024-04-22 DIAGNOSIS — E53.8 FOLATE DEFICIENCY: ICD-10-CM

## 2024-04-22 RX ORDER — FOLIC ACID 1 MG/1
1000 TABLET ORAL DAILY
Qty: 90 TABLET | Refills: 1 | Status: SHIPPED | OUTPATIENT
Start: 2024-04-22

## 2024-04-22 NOTE — TELEPHONE ENCOUNTER
Medication:   Requested Prescriptions     Pending Prescriptions Disp Refills    folic acid (FOLVITE) 1 MG tablet [Pharmacy Med Name: FOLIC ACID 1 MG TABLET] 90 tablet 1     Sig: TAKE 1 TABLET BY MOUTH EVERY DAY        Last Filled:  10/26/23    Patient Phone Number: [phone] (home)     Last appt: 10/25/2023   Next appt: Visit date not found    Last OARRS:        No data to display

## 2024-05-14 ENCOUNTER — TELEPHONE (OUTPATIENT)
Dept: FAMILY MEDICINE CLINIC | Age: 59
End: 2024-05-14

## 2024-05-14 DIAGNOSIS — G60.9 IDIOPATHIC PERIPHERAL NEUROPATHY: ICD-10-CM

## 2024-05-14 RX ORDER — GABAPENTIN 400 MG/1
CAPSULE ORAL
Qty: 180 CAPSULE | Refills: 5 | Status: SHIPPED | OUTPATIENT
Start: 2024-05-14 | End: 2024-08-14

## 2024-09-13 ENCOUNTER — OFFICE VISIT (OUTPATIENT)
Dept: FAMILY MEDICINE CLINIC | Age: 59
End: 2024-09-13

## 2024-09-13 ENCOUNTER — TELEPHONE (OUTPATIENT)
Dept: FAMILY MEDICINE CLINIC | Age: 59
End: 2024-09-13

## 2024-09-13 VITALS
WEIGHT: 203 LBS | TEMPERATURE: 98.1 F | SYSTOLIC BLOOD PRESSURE: 120 MMHG | DIASTOLIC BLOOD PRESSURE: 90 MMHG | OXYGEN SATURATION: 97 % | BODY MASS INDEX: 25.37 KG/M2 | HEART RATE: 102 BPM

## 2024-09-13 DIAGNOSIS — U07.1 COVID-19: ICD-10-CM

## 2024-09-13 DIAGNOSIS — R50.9 FEVER, UNSPECIFIED: ICD-10-CM

## 2024-09-13 DIAGNOSIS — R05.1 ACUTE COUGH: Primary | ICD-10-CM

## 2024-09-13 DIAGNOSIS — R06.2 WHEEZE: ICD-10-CM

## 2024-09-13 LAB
Lab: ABNORMAL
QC PASS/FAIL: ABNORMAL
SARS-COV-2 RDRP RESP QL NAA+PROBE: POSITIVE

## 2024-09-13 RX ORDER — ALBUTEROL SULFATE 90 UG/1
2 AEROSOL, METERED RESPIRATORY (INHALATION) EVERY 4 HOURS PRN
Qty: 18 G | Refills: 3 | Status: SHIPPED | OUTPATIENT
Start: 2024-09-13

## 2024-09-13 RX ORDER — PREDNISONE 20 MG/1
40 TABLET ORAL DAILY
Qty: 10 TABLET | Refills: 0 | Status: SHIPPED | OUTPATIENT
Start: 2024-09-13 | End: 2024-09-18

## 2024-09-13 SDOH — ECONOMIC STABILITY: FOOD INSECURITY: WITHIN THE PAST 12 MONTHS, THE FOOD YOU BOUGHT JUST DIDN'T LAST AND YOU DIDN'T HAVE MONEY TO GET MORE.: NEVER TRUE

## 2024-09-13 SDOH — ECONOMIC STABILITY: FOOD INSECURITY: WITHIN THE PAST 12 MONTHS, YOU WORRIED THAT YOUR FOOD WOULD RUN OUT BEFORE YOU GOT MONEY TO BUY MORE.: NEVER TRUE

## 2024-09-13 SDOH — ECONOMIC STABILITY: INCOME INSECURITY: HOW HARD IS IT FOR YOU TO PAY FOR THE VERY BASICS LIKE FOOD, HOUSING, MEDICAL CARE, AND HEATING?: NOT HARD AT ALL

## 2024-09-13 ASSESSMENT — PATIENT HEALTH QUESTIONNAIRE - PHQ9
1. LITTLE INTEREST OR PLEASURE IN DOING THINGS: NOT AT ALL
SUM OF ALL RESPONSES TO PHQ9 QUESTIONS 1 & 2: 0
SUM OF ALL RESPONSES TO PHQ QUESTIONS 1-9: 0
2. FEELING DOWN, DEPRESSED OR HOPELESS: NOT AT ALL
SUM OF ALL RESPONSES TO PHQ QUESTIONS 1-9: 0

## 2024-09-13 ASSESSMENT — ANXIETY QUESTIONNAIRES
2. NOT BEING ABLE TO STOP OR CONTROL WORRYING: NOT AT ALL
6. BECOMING EASILY ANNOYED OR IRRITABLE: NOT AT ALL
3. WORRYING TOO MUCH ABOUT DIFFERENT THINGS: NOT AT ALL
IF YOU CHECKED OFF ANY PROBLEMS ON THIS QUESTIONNAIRE, HOW DIFFICULT HAVE THESE PROBLEMS MADE IT FOR YOU TO DO YOUR WORK, TAKE CARE OF THINGS AT HOME, OR GET ALONG WITH OTHER PEOPLE: NOT DIFFICULT AT ALL
GAD7 TOTAL SCORE: 0
7. FEELING AFRAID AS IF SOMETHING AWFUL MIGHT HAPPEN: NOT AT ALL
5. BEING SO RESTLESS THAT IT IS HARD TO SIT STILL: NOT AT ALL
4. TROUBLE RELAXING: NOT AT ALL
1. FEELING NERVOUS, ANXIOUS, OR ON EDGE: NOT AT ALL

## 2024-09-20 ENCOUNTER — OFFICE VISIT (OUTPATIENT)
Dept: FAMILY MEDICINE CLINIC | Age: 59
End: 2024-09-20

## 2024-09-20 VITALS
WEIGHT: 206 LBS | BODY MASS INDEX: 25.75 KG/M2 | OXYGEN SATURATION: 96 % | SYSTOLIC BLOOD PRESSURE: 110 MMHG | DIASTOLIC BLOOD PRESSURE: 70 MMHG | HEART RATE: 93 BPM

## 2024-09-20 DIAGNOSIS — K70.30 ALCOHOLIC CIRRHOSIS OF LIVER WITHOUT ASCITES (HCC): ICD-10-CM

## 2024-09-20 DIAGNOSIS — U07.1 COVID-19: ICD-10-CM

## 2024-09-20 DIAGNOSIS — R05.1 ACUTE COUGH: ICD-10-CM

## 2024-09-20 DIAGNOSIS — Z87.891 HISTORY OF TOBACCO USE: ICD-10-CM

## 2024-09-20 DIAGNOSIS — N52.9 ERECTILE DYSFUNCTION, UNSPECIFIED ERECTILE DYSFUNCTION TYPE: ICD-10-CM

## 2024-09-20 DIAGNOSIS — Z00.00 ANNUAL PHYSICAL EXAM: Primary | ICD-10-CM

## 2024-09-20 RX ORDER — SILDENAFIL 100 MG/1
100 TABLET, FILM COATED ORAL DAILY PRN
Qty: 30 TABLET | Refills: 1 | Status: SHIPPED | OUTPATIENT
Start: 2024-09-20

## 2024-09-23 ENCOUNTER — TELEPHONE (OUTPATIENT)
Dept: ADMINISTRATIVE | Age: 59
End: 2024-09-23

## 2024-09-23 ENCOUNTER — TELEPHONE (OUTPATIENT)
Dept: FAMILY MEDICINE CLINIC | Age: 59
End: 2024-09-23

## 2024-09-23 DIAGNOSIS — N52.9 ERECTILE DYSFUNCTION, UNSPECIFIED ERECTILE DYSFUNCTION TYPE: ICD-10-CM

## 2024-09-23 DIAGNOSIS — Z00.00 ANNUAL PHYSICAL EXAM: ICD-10-CM

## 2024-09-23 LAB
ALBUMIN SERPL-MCNC: 3.9 G/DL (ref 3.4–5)
ALBUMIN/GLOB SERPL: 1.4 {RATIO} (ref 1.1–2.2)
ALP SERPL-CCNC: 183 U/L (ref 40–129)
ALT SERPL-CCNC: 25 U/L (ref 10–40)
ANION GAP SERPL CALCULATED.3IONS-SCNC: 10 MMOL/L (ref 3–16)
AST SERPL-CCNC: 37 U/L (ref 15–37)
BASOPHILS # BLD: 0.1 K/UL (ref 0–0.2)
BASOPHILS NFR BLD: 0.9 %
BILIRUB SERPL-MCNC: 0.7 MG/DL (ref 0–1)
BUN SERPL-MCNC: 10 MG/DL (ref 7–20)
CALCIUM SERPL-MCNC: 9.2 MG/DL (ref 8.3–10.6)
CHLORIDE SERPL-SCNC: 97 MMOL/L (ref 99–110)
CHOLEST SERPL-MCNC: 154 MG/DL (ref 0–199)
CO2 SERPL-SCNC: 27 MMOL/L (ref 21–32)
CREAT SERPL-MCNC: 0.9 MG/DL (ref 0.9–1.3)
DEPRECATED RDW RBC AUTO: 15.6 % (ref 12.4–15.4)
EOSINOPHIL # BLD: 0.2 K/UL (ref 0–0.6)
EOSINOPHIL NFR BLD: 3.1 %
EST. AVERAGE GLUCOSE BLD GHB EST-MCNC: 111.2 MG/DL
GFR SERPLBLD CREATININE-BSD FMLA CKD-EPI: >90 ML/MIN/{1.73_M2}
GLUCOSE SERPL-MCNC: 107 MG/DL (ref 70–99)
HBA1C MFR BLD: 5.5 %
HCT VFR BLD AUTO: 41 % (ref 40.5–52.5)
HDLC SERPL-MCNC: 34 MG/DL (ref 40–60)
HGB BLD-MCNC: 13.7 G/DL (ref 13.5–17.5)
LDL CHOLESTEROL: 96 MG/DL
LYMPHOCYTES # BLD: 1.5 K/UL (ref 1–5.1)
LYMPHOCYTES NFR BLD: 21 %
MCH RBC QN AUTO: 35.6 PG (ref 26–34)
MCHC RBC AUTO-ENTMCNC: 33.5 G/DL (ref 31–36)
MCV RBC AUTO: 106.4 FL (ref 80–100)
MONOCYTES # BLD: 0.8 K/UL (ref 0–1.3)
MONOCYTES NFR BLD: 11.7 %
NEUTROPHILS # BLD: 4.4 K/UL (ref 1.7–7.7)
NEUTROPHILS NFR BLD: 63.3 %
PLATELET # BLD AUTO: 116 K/UL (ref 135–450)
PMV BLD AUTO: 10.1 FL (ref 5–10.5)
POTASSIUM SERPL-SCNC: 4.3 MMOL/L (ref 3.5–5.1)
PROT SERPL-MCNC: 6.7 G/DL (ref 6.4–8.2)
RBC # BLD AUTO: 3.86 M/UL (ref 4.2–5.9)
SODIUM SERPL-SCNC: 134 MMOL/L (ref 136–145)
TRIGL SERPL-MCNC: 121 MG/DL (ref 0–150)
VLDLC SERPL CALC-MCNC: 24 MG/DL
WBC # BLD AUTO: 6.9 K/UL (ref 4–11)

## 2024-09-23 NOTE — TELEPHONE ENCOUNTER
Pt said \"no\" he has cellulitis urgent care doctor told pt it could of been from the shot   Pt is still having cough and a sore throat pt said that what he came in for and doesn't feel any better and wants some medication to try to help it go away

## 2024-09-23 NOTE — TELEPHONE ENCOUNTER
Patient stopped in to have labs done and said he was seen at urgent care over the weekend     He was given Amox 875-125 mg    He was given a flu shot and second shingrx shot and pneumonia shot left arm and said it is red and swollen  not sure if at office or urgent care he didn't say     He said it swelled up over the weekend     He would like to know what he needed to do.     He was offered an appointment for today and said he just wanted to speak with someone.     Please call and advise

## 2024-09-23 NOTE — TELEPHONE ENCOUNTER
Submitted PA for Sildenafil Citrate 100MG tablets  Via CMM Key: [id number]  STATUS: DENIED - medications used for erectile dysfunction are a Plan Exclusion.    If this requires a response please respond to the pool ( P [facility] MEDICATION PRE-AUTH).      Thank you please advise patient.

## 2024-09-25 LAB
SHBG SERPL-SCNC: 73 NMOL/L (ref 19–76)
TESTOST FREE SERPL-MCNC: 53.3 PG/ML (ref 47–244)
TESTOST SERPL-MCNC: 453 NG/DL (ref 193–740)

## 2024-09-27 DIAGNOSIS — E53.8 VITAMIN B12 DEFICIENCY: Primary | ICD-10-CM

## 2024-09-27 RX ORDER — UREA 10 %
500 LOTION (ML) TOPICAL DAILY
Qty: 30 TABLET | Refills: 3 | Status: SHIPPED | OUTPATIENT
Start: 2024-09-27 | End: 2025-09-27

## 2024-10-10 DIAGNOSIS — G60.9 IDIOPATHIC PERIPHERAL NEUROPATHY: ICD-10-CM

## 2024-10-10 RX ORDER — GABAPENTIN 800 MG/1
800 TABLET ORAL 3 TIMES DAILY
Qty: 90 TABLET | Refills: 2 | Status: CANCELLED | OUTPATIENT
Start: 2024-10-10 | End: 2025-01-08

## 2024-10-10 NOTE — TELEPHONE ENCOUNTER
Medication and Quantity requested:        gabapentin (NEURONTIN) 400 MG capsule [[id number]]  ENDED         Last Visit    09/13/2024    Pharmacy and phone number updated in EPIC:  yes    [facility]

## 2024-10-10 NOTE — TELEPHONE ENCOUNTER
Medication:   Requested Prescriptions     Pending Prescriptions Disp Refills    gabapentin (NEURONTIN) 800 MG tablet 90 tablet 2     Sig: Take 1 tablet by mouth 3 times daily for 90 days.        Last Filled:  02/13/2024, 90, 2    Patient Phone Number: [phone] (home)     Last appt: 9/20/2024   Next appt: Visit date not found    Last OARRS:        No data to display

## 2024-10-11 RX ORDER — GABAPENTIN 400 MG/1
CAPSULE ORAL
Qty: 180 CAPSULE | Refills: 5 | Status: SHIPPED | OUTPATIENT
Start: 2024-10-11 | End: 2025-04-11

## 2024-10-11 NOTE — TELEPHONE ENCOUNTER
Medication:   Requested Prescriptions     Pending Prescriptions Disp Refills    gabapentin (NEURONTIN) 400 MG capsule [Pharmacy Med Name: GABAPENTIN 400 MG CAPSULE] 180 capsule 5     Sig: TAKE 2 CAPSULES BY MOUTH 3 TIMES A DAY        Last Filled:  9/14/2024     Patient Phone Number: [phone] (home)     Last appt: 9/20/2024   Next appt: 10/10/2024    Last OARRS:        No data to display

## 2024-10-11 NOTE — TELEPHONE ENCOUNTER
Patient called to check on status of this medication he is completely and needs refilled     Please call and advise

## 2025-01-08 ENCOUNTER — TELEPHONE (OUTPATIENT)
Dept: ORTHOPEDIC SURGERY | Age: 60
End: 2025-01-08

## 2025-01-08 NOTE — TELEPHONE ENCOUNTER
General Question     Subject: NEW PATIENT Dupuytren's FOOT   Patient and /or Facility Request:  Contact Number: [phone]     PATIENT WOULD LIKE TO BE SEEN FOR HIS Dupuytren's IN LT FOOT WAS IN HAND NOW ITS IN HIS FOOT     PLEASE ADVISE

## 2025-01-08 NOTE — TELEPHONE ENCOUNTER
General Question     Subject: NP  Dupuytren's FOOT   Patient and /or Facility Request: Sammy Kirby   Contact Number: [phone]       PATIENT WOULD LIKE TO BE SEEN  IF DR JEREZ WILL SEE PATIENT FOR HIS Dupuytren's IN LT FOOT WAS IN HAND NOW ITS IN HIS FOOT     PLEASE CALL PATIENT AT THE ABOVE NUMBER

## 2025-01-29 ENCOUNTER — OFFICE VISIT (OUTPATIENT)
Dept: FAMILY MEDICINE CLINIC | Age: 60
End: 2025-01-29
Payer: COMMERCIAL

## 2025-01-29 VITALS
SYSTOLIC BLOOD PRESSURE: 138 MMHG | HEART RATE: 82 BPM | OXYGEN SATURATION: 98 % | DIASTOLIC BLOOD PRESSURE: 84 MMHG | BODY MASS INDEX: 27 KG/M2 | WEIGHT: 216 LBS

## 2025-01-29 DIAGNOSIS — R22.40 MASS OF TOE: Primary | ICD-10-CM

## 2025-01-29 DIAGNOSIS — L85.3 DRY SKIN DERMATITIS: ICD-10-CM

## 2025-01-29 PROCEDURE — G8419 CALC BMI OUT NRM PARAM NOF/U: HCPCS | Performed by: FAMILY MEDICINE

## 2025-01-29 PROCEDURE — 3017F COLORECTAL CA SCREEN DOC REV: CPT | Performed by: FAMILY MEDICINE

## 2025-01-29 PROCEDURE — 99213 OFFICE O/P EST LOW 20 MIN: CPT | Performed by: FAMILY MEDICINE

## 2025-01-29 PROCEDURE — G8427 DOCREV CUR MEDS BY ELIG CLIN: HCPCS | Performed by: FAMILY MEDICINE

## 2025-01-29 PROCEDURE — 3079F DIAST BP 80-89 MM HG: CPT | Performed by: FAMILY MEDICINE

## 2025-01-29 PROCEDURE — 4004F PT TOBACCO SCREEN RCVD TLK: CPT | Performed by: FAMILY MEDICINE

## 2025-01-29 PROCEDURE — 3075F SYST BP GE 130 - 139MM HG: CPT | Performed by: FAMILY MEDICINE

## 2025-01-29 RX ORDER — CLOTRIMAZOLE AND BETAMETHASONE DIPROPIONATE 10; .64 MG/G; MG/G
CREAM TOPICAL
Qty: 45 G | Refills: 1 | Status: SHIPPED | OUTPATIENT
Start: 2025-01-29

## 2025-01-29 SDOH — ECONOMIC STABILITY: FOOD INSECURITY: WITHIN THE PAST 12 MONTHS, YOU WORRIED THAT YOUR FOOD WOULD RUN OUT BEFORE YOU GOT MONEY TO BUY MORE.: NEVER TRUE

## 2025-01-29 SDOH — ECONOMIC STABILITY: FOOD INSECURITY: WITHIN THE PAST 12 MONTHS, THE FOOD YOU BOUGHT JUST DIDN'T LAST AND YOU DIDN'T HAVE MONEY TO GET MORE.: NEVER TRUE

## 2025-01-29 ASSESSMENT — PATIENT HEALTH QUESTIONNAIRE - PHQ9
SUM OF ALL RESPONSES TO PHQ QUESTIONS 1-9: 0
SUM OF ALL RESPONSES TO PHQ QUESTIONS 1-9: 0
1. LITTLE INTEREST OR PLEASURE IN DOING THINGS: NOT AT ALL
SUM OF ALL RESPONSES TO PHQ QUESTIONS 1-9: 0
SUM OF ALL RESPONSES TO PHQ9 QUESTIONS 1 & 2: 0
2. FEELING DOWN, DEPRESSED OR HOPELESS: NOT AT ALL
SUM OF ALL RESPONSES TO PHQ QUESTIONS 1-9: 0

## 2025-01-29 NOTE — PROGRESS NOTES
Sammy Kirby is a 59 y.o. male.    HPI:  Here for painful bump left great toe / pain  Cannot wear closed toed shoes  Needs help  This has been ongoing for few months    Meds, vitamins and allergies reviewed with pt    Wt Readings from Last 3 Encounters:   01/29/25 98 kg (216 lb)   09/20/24 93.4 kg (206 lb)   09/13/24 92.1 kg (203 lb)       REVIEW OF SYSTEMS:   CONSTITUTIONAL: See history of present illness,   Weight noted   HEENT: No new vision difficulties or ringing in the ears.   RESPIRATORY: No new SOB, PND, orthopnea or cough.   CARDIOVASCULAR: no CP, palpitations or SOB with exertion  GI: No nausea, vomiting, diarrhea, constipation, abdominal pain or changes in bowel habits.   : No urinary frequency, urgency, incontinence hematuria or dysuria.   SKIN: No cyanosis or skin lesions.   MUSCULOSKELETAL: No new muscle or joint pain.   NEUROLOGICAL: No syncope or TIA-like symptoms.   PSYCHIATRIC: No anxiety, insomnia or depression     No Known Allergies    Prior to Visit Medications    Medication Sig Taking? Authorizing Provider   clotrimazole-betamethasone (LOTRISONE) 1-0.05 % cream Apply topically 2 times daily. Yes Carmina Madrigal MD   gabapentin (NEURONTIN) 400 MG capsule TAKE 2 CAPSULES BY MOUTH 3 TIMES A DAY Yes Jim Latif MD   vitamin B-12 (CYANOCOBALAMIN) 500 MCG tablet Take 1 tablet by mouth daily Yes Jim Latif MD   sildenafil (VIAGRA) 100 MG tablet Take 1 tablet by mouth daily as needed for Erectile Dysfunction Yes Jim Latif MD   albuterol sulfate HFA (PROVENTIL HFA) 108 (90 Base) MCG/ACT inhaler Inhale 2 puffs into the lungs every 4 hours as needed for Wheezing Yes Jim Latif MD   folic acid (FOLVITE) 1 MG tablet TAKE 1 TABLET BY MOUTH EVERY DAY Yes Jim Latif MD   gabapentin (NEURONTIN) 800 MG tablet Take 1 tablet by mouth 3 times daily for 90 days.  Donn Zayas DO       Past Medical History:   Diagnosis Date    Alcohol abuse     Gout     Hepatic disease

## 2025-02-12 DIAGNOSIS — E53.8 FOLATE DEFICIENCY: ICD-10-CM

## 2025-02-12 RX ORDER — FOLIC ACID 1 MG/1
1000 TABLET ORAL DAILY
Qty: 90 TABLET | Refills: 1 | Status: SHIPPED | OUTPATIENT
Start: 2025-02-12

## 2025-02-12 NOTE — TELEPHONE ENCOUNTER
Medication:   Requested Prescriptions     Pending Prescriptions Disp Refills    folic acid (FOLVITE) 1 MG tablet [Pharmacy Med Name: FOLIC ACID 1 MG TABLET] 90 tablet 1     Sig: TAKE 1 TABLET BY MOUTH EVERY DAY        Last Filled:  4/22/24    Patient Phone Number: [phone] (home)     Last appt: 1/29/2025   Next appt: Visit date not found    Last OARRS:        No data to display

## 2025-03-13 ENCOUNTER — OFFICE VISIT (OUTPATIENT)
Dept: FAMILY MEDICINE CLINIC | Age: 60
End: 2025-03-13
Payer: COMMERCIAL

## 2025-03-13 VITALS
WEIGHT: 212 LBS | HEART RATE: 94 BPM | OXYGEN SATURATION: 92 % | DIASTOLIC BLOOD PRESSURE: 84 MMHG | SYSTOLIC BLOOD PRESSURE: 134 MMHG | BODY MASS INDEX: 26.5 KG/M2

## 2025-03-13 DIAGNOSIS — I10 PRIMARY HYPERTENSION: ICD-10-CM

## 2025-03-13 DIAGNOSIS — Z01.818 PRE-OP EXAM: Primary | ICD-10-CM

## 2025-03-13 LAB
ANION GAP SERPL CALCULATED.3IONS-SCNC: 11 MMOL/L (ref 3–16)
BUN SERPL-MCNC: 10 MG/DL (ref 7–20)
CALCIUM SERPL-MCNC: 9.2 MG/DL (ref 8.3–10.6)
CHLORIDE SERPL-SCNC: 101 MMOL/L (ref 99–110)
CO2 SERPL-SCNC: 27 MMOL/L (ref 21–32)
CREAT SERPL-MCNC: 0.9 MG/DL (ref 0.9–1.3)
GFR SERPLBLD CREATININE-BSD FMLA CKD-EPI: >90 ML/MIN/{1.73_M2}
GLUCOSE SERPL-MCNC: 162 MG/DL (ref 70–99)
POTASSIUM SERPL-SCNC: 3.7 MMOL/L (ref 3.5–5.1)
SODIUM SERPL-SCNC: 139 MMOL/L (ref 136–145)

## 2025-03-13 PROCEDURE — 3075F SYST BP GE 130 - 139MM HG: CPT | Performed by: STUDENT IN AN ORGANIZED HEALTH CARE EDUCATION/TRAINING PROGRAM

## 2025-03-13 PROCEDURE — 99213 OFFICE O/P EST LOW 20 MIN: CPT | Performed by: STUDENT IN AN ORGANIZED HEALTH CARE EDUCATION/TRAINING PROGRAM

## 2025-03-13 PROCEDURE — G8419 CALC BMI OUT NRM PARAM NOF/U: HCPCS | Performed by: STUDENT IN AN ORGANIZED HEALTH CARE EDUCATION/TRAINING PROGRAM

## 2025-03-13 PROCEDURE — 4004F PT TOBACCO SCREEN RCVD TLK: CPT | Performed by: STUDENT IN AN ORGANIZED HEALTH CARE EDUCATION/TRAINING PROGRAM

## 2025-03-13 PROCEDURE — G8427 DOCREV CUR MEDS BY ELIG CLIN: HCPCS | Performed by: STUDENT IN AN ORGANIZED HEALTH CARE EDUCATION/TRAINING PROGRAM

## 2025-03-13 PROCEDURE — 3017F COLORECTAL CA SCREEN DOC REV: CPT | Performed by: STUDENT IN AN ORGANIZED HEALTH CARE EDUCATION/TRAINING PROGRAM

## 2025-03-13 PROCEDURE — 3079F DIAST BP 80-89 MM HG: CPT | Performed by: STUDENT IN AN ORGANIZED HEALTH CARE EDUCATION/TRAINING PROGRAM

## 2025-03-13 NOTE — PROGRESS NOTES
[facility]  Preoperative visit   3/13/2025    Patient is here for preop evaluation at the request of Dr. 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 Is scheduled for surgery on 03/25    Functional Assessment:  Exercise tolerance: >4 METS using the DASI calculator   Denies any current chest pain or discomfort, sob or MEDEL, orthopnea, PND or leg swelling.     Medications: reviewed, Over the counter (NSAIDs) use: per surgical team    Cardiac Risk:  High-risk Surgery: No  Hx of ischemic heart disease: No  Hx of CHF: No  Hx of CVA: No  Pre-op insulin: No  Pre-op creatinine >2.0: Drawn today    RADHA Screen:  Snore loudly? No   Feel tired/fatigued during the day? No  Stop breathing while sleeping? No  High blood pressure? No  Morning HA? No    History of surgery/ anesthesia:  - No hx of complications, anesthesia reaction, bleeding, bruising, clots  - No family Hx of reactions to anesthesia/ bleeding/clots  - No loose teeth/ dentures    Social Screen  - Declined pregnancy test today. N/A  - Support systems after surgery:  - Smoking/ alcohol/drugs:   - Complicating medical diseases are currently well controlled.     Problem List  Patient Active Problem List   Diagnosis    Hypertension    Hepatic disease    Hyperlipidemia    Neuropathy, peripheral    Alcohol abuse    Dupuytren contracture    Smoking    Arnel's nodes (with arthropathy)    Arthritis    Idiopathic gout    Incarcerated umbilical hernia    Alcoholic cirrhosis of liver without ascites (HCC)       Medical and Surgical History  Past Medical History:   Diagnosis Date    Alcohol abuse     Gout     Hepatic disease     Hyperlipidemia     Hypertension     Neuropathy, peripheral      Past Surgical History:   Procedure Laterality Date    MOUTH SURGERY      UMBILICAL HERNIA REPAIR N/A 1/23/2023    OPEN REPAIR OF UMBILICAL HERNIA WITH MESH performed by Patrick Osorio MD at [facility] OR       Medications  Prior to Visit Medications

## 2025-03-14 ENCOUNTER — RESULTS FOLLOW-UP (OUTPATIENT)
Dept: FAMILY MEDICINE CLINIC | Age: 60
End: 2025-03-14

## 2025-03-24 DIAGNOSIS — G60.9 IDIOPATHIC PERIPHERAL NEUROPATHY: ICD-10-CM

## 2025-03-24 DIAGNOSIS — E53.8 VITAMIN B12 DEFICIENCY: ICD-10-CM

## 2025-03-25 RX ORDER — MULTIVITAMIN WITH IRON
500 TABLET ORAL DAILY
Qty: 90 TABLET | Refills: 1 | Status: SHIPPED | OUTPATIENT
Start: 2025-03-25

## 2025-03-25 RX ORDER — GABAPENTIN 400 MG/1
CAPSULE ORAL
Qty: 180 CAPSULE | Refills: 3 | Status: SHIPPED | OUTPATIENT
Start: 2025-03-25 | End: 2025-07-25

## 2025-04-21 RX ORDER — CLOTRIMAZOLE AND BETAMETHASONE DIPROPIONATE 10; .64 MG/G; MG/G
CREAM TOPICAL 2 TIMES DAILY
Qty: 45 G | Refills: 1 | Status: SHIPPED | OUTPATIENT
Start: 2025-04-21

## 2025-04-21 NOTE — TELEPHONE ENCOUNTER
Medication:   Requested Prescriptions     Pending Prescriptions Disp Refills    clotrimazole-betamethasone (LOTRISONE) 1-0.05 % cream [Pharmacy Med Name: CLOTRIMAZOLE/BETAMETH CREAM] 45 g 1     Sig: APPLY TO AFFECTED AREA TWICE A DAY        Last Filled:  01/29/2025    Patient Phone Number: [phone] (home)     Last appt: 3/13/2025   Next appt: Visit date not found    Last OARRS:        No data to display

## 2025-05-11 DIAGNOSIS — R06.2 WHEEZE: ICD-10-CM

## 2025-05-11 NOTE — TELEPHONE ENCOUNTER
Medication:   Requested Prescriptions     Pending Prescriptions Disp Refills    albuterol sulfate HFA (PROVENTIL;VENTOLIN;PROAIR) 108 (90 Base) MCG/ACT inhaler [Pharmacy Med Name: ALBUTEROL HFA (VENTOLIN) INH] 18 each 3     Sig: INHALE 2 PUFFS BY MOUTH EVERY 4 HOURS AS NEEDED FOR WHEEZE        Last Filled:  09/13/2024    Patient Phone Number: [phone] (home)     Last appt: 3/13/2025   Next appt: Visit date not found    Last OARRS:        No data to display

## 2025-05-12 RX ORDER — ALBUTEROL SULFATE 90 UG/1
INHALANT RESPIRATORY (INHALATION)
Qty: 18 EACH | Refills: 3 | Status: SHIPPED | OUTPATIENT
Start: 2025-05-12

## 2025-06-13 DIAGNOSIS — G60.9 IDIOPATHIC PERIPHERAL NEUROPATHY: ICD-10-CM

## 2025-06-14 NOTE — TELEPHONE ENCOUNTER
Medication:   Requested Prescriptions     Pending Prescriptions Disp Refills    gabapentin (NEURONTIN) 400 MG capsule [Pharmacy Med Name: GABAPENTIN 400 MG CAPSULE] 180 capsule 3     Sig: TAKE 2 CAPSULES BY MOUTH 3 TIMES A DAY        Last Filled:  03/25/2025    Patient Phone Number: [phone] (home)     Last appt: 3/13/2025   Next appt: Visit date not found    Last OARRS:        No data to display

## 2025-06-16 RX ORDER — GABAPENTIN 400 MG/1
CAPSULE ORAL
Qty: 180 CAPSULE | Refills: 3 | Status: SHIPPED | OUTPATIENT
Start: 2025-06-16 | End: 2025-10-16

## 2025-08-05 ENCOUNTER — TELEPHONE (OUTPATIENT)
Dept: FAMILY MEDICINE CLINIC | Age: 60
End: 2025-08-05

## (undated) DEVICE — GOWN SIRUS NONREIN LG W/TWL: Brand: MEDLINE INDUSTRIES, INC.

## (undated) DEVICE — PACK PROCEDURE SURG EXTREMITY MFFOP CUST

## (undated) DEVICE — BLADE ES ELASTOMERIC COAT INSUL DURABLE BEND UPTO 90DEG

## (undated) DEVICE — HYPODERMIC SAFETY NEEDLE: Brand: MAGELLAN

## (undated) DEVICE — 3M™ TEGADERM™ TRANSPARENT FILM DRESSING FRAME STYLE, 1626, 4 IN X 4-3/4 IN (10 CM X 12 CM), 50/CT 4CT/CASE: Brand: 3M™ TEGADERM™

## (undated) DEVICE — SUTURE VCRL + SZ 3-0 L18IN ABSRB UD SH 1/2 CIR TAPERCUT NDL VCP864D

## (undated) DEVICE — APPLICATOR MEDICATED 26 CC SOLUTION HI LT ORNG CHLORAPREP

## (undated) DEVICE — ADHESIVE SKIN CLSR 0.7ML TOP DERMBND ADV

## (undated) DEVICE — 3M™ IOBAN™ 2 ANTIMICROBIAL INCISE DRAPE 6650EZ: Brand: IOBAN™ 2

## (undated) DEVICE — GLOVE SURG SZ 6.5 L11.2IN FNGR THK9.8MIL STRW LTX POLYMER

## (undated) DEVICE — SYRINGE MED 10ML TRNSLUC BRL PLUNG BLK MRK POLYPR CTRL

## (undated) DEVICE — TOWEL,OR,DSP,ST,BLUE,STD,4/PK,20PK/CS: Brand: MEDLINE

## (undated) DEVICE — MEDICINE CUP, GRADUATED, STER: Brand: MEDLINE

## (undated) DEVICE — SHEET,DRAPE,53X77,STERILE: Brand: MEDLINE

## (undated) DEVICE — PENCIL SMK EVAC TELSCP 3 M TBNG

## (undated) DEVICE — MASC TURNOVER KIT: Brand: MEDLINE INDUSTRIES, INC.

## (undated) DEVICE — SUTURE MCRYL + SZ 4-0 L27IN ABSRB UD L19MM PS-2 3/8 CIR MCP426H

## (undated) DEVICE — SYRINGE,CONTROL,LL,FINGER,GRIP: Brand: MEDLINE INDUSTRIES, INC.

## (undated) DEVICE — PENCIL SMK EVAC L10FT TBNG NONSTICK ESU BLDE PLUMEPEN ELITE

## (undated) DEVICE — SUTURE ETHBND EXCEL SZ 0 L18IN NONABSORBABLE GRN L36MM CT-1 CX21D

## (undated) DEVICE — INTENDED FOR TISSUE SEPARATION, AND OTHER PROCEDURES THAT REQUIRE A SHARP SURGICAL BLADE TO PUNCTURE OR CUT.: Brand: BARD-PARKER ® STAINLESS STEEL BLADES

## (undated) DEVICE — SHEET, T, LAPAROTOMY, STERILE: Brand: MEDLINE